# Patient Record
Sex: FEMALE | Race: WHITE | Employment: FULL TIME | URBAN - METROPOLITAN AREA
[De-identification: names, ages, dates, MRNs, and addresses within clinical notes are randomized per-mention and may not be internally consistent; named-entity substitution may affect disease eponyms.]

---

## 2019-12-11 ENCOUNTER — HOSPITAL ENCOUNTER (EMERGENCY)
Age: 64
Discharge: ANOTHER HOSPITAL | End: 2019-12-11
Attending: EMERGENCY MEDICINE
Payer: MEDICARE

## 2019-12-11 ENCOUNTER — APPOINTMENT (OUTPATIENT)
Dept: GENERAL RADIOLOGY | Age: 64
End: 2019-12-11
Attending: EMERGENCY MEDICINE
Payer: MEDICARE

## 2019-12-11 VITALS
RESPIRATION RATE: 17 BRPM | DIASTOLIC BLOOD PRESSURE: 73 MMHG | TEMPERATURE: 97.7 F | OXYGEN SATURATION: 100 % | SYSTOLIC BLOOD PRESSURE: 109 MMHG | HEART RATE: 92 BPM

## 2019-12-11 DIAGNOSIS — S21.212A STAB WOUND OF LEFT SIDE OF BACK, INITIAL ENCOUNTER: Primary | ICD-10-CM

## 2019-12-11 DIAGNOSIS — S11.91XA STAB WOUND OF NECK, INITIAL ENCOUNTER: ICD-10-CM

## 2019-12-11 DIAGNOSIS — S21.119A STAB WOUND OF CHEST, UNSPECIFIED LATERALITY, INITIAL ENCOUNTER: ICD-10-CM

## 2019-12-11 LAB
ALBUMIN SERPL-MCNC: 4.1 G/DL (ref 3.4–5)
ALBUMIN/GLOB SERPL: 1.6 {RATIO} (ref 0.8–1.7)
ALP SERPL-CCNC: 89 U/L (ref 45–117)
ALT SERPL-CCNC: 51 U/L (ref 13–56)
ANION GAP BLD CALC-SCNC: 18 MMOL/L (ref 10–20)
ANION GAP SERPL CALC-SCNC: 8 MMOL/L (ref 3–18)
AST SERPL-CCNC: 61 U/L (ref 10–38)
BASOPHILS # BLD: 0.1 K/UL (ref 0–0.1)
BASOPHILS NFR BLD: 1 % (ref 0–2)
BILIRUB SERPL-MCNC: 0.4 MG/DL (ref 0.2–1)
BUN BLD-MCNC: 17 MG/DL (ref 7–18)
BUN SERPL-MCNC: 16 MG/DL (ref 7–18)
BUN/CREAT SERPL: 16 (ref 12–20)
CA-I BLD-MCNC: 1.14 MMOL/L (ref 1.12–1.32)
CALCIUM SERPL-MCNC: 8.5 MG/DL (ref 8.5–10.1)
CHLORIDE BLD-SCNC: 105 MMOL/L (ref 100–108)
CHLORIDE SERPL-SCNC: 109 MMOL/L (ref 100–111)
CO2 BLD-SCNC: 24 MMOL/L (ref 19–24)
CO2 SERPL-SCNC: 26 MMOL/L (ref 21–32)
CREAT SERPL-MCNC: 0.98 MG/DL (ref 0.6–1.3)
CREAT UR-MCNC: 1.1 MG/DL (ref 0.6–1.3)
DIFFERENTIAL METHOD BLD: ABNORMAL
EOSINOPHIL # BLD: 0.2 K/UL (ref 0–0.4)
EOSINOPHIL NFR BLD: 2 % (ref 0–5)
ERYTHROCYTE [DISTWIDTH] IN BLOOD BY AUTOMATED COUNT: 13.1 % (ref 11.6–14.5)
ETHANOL SERPL-MCNC: 240 MG/DL (ref 0–3)
GLOBULIN SER CALC-MCNC: 2.5 G/DL (ref 2–4)
GLUCOSE BLD STRIP.AUTO-MCNC: 161 MG/DL (ref 74–106)
GLUCOSE SERPL-MCNC: 158 MG/DL (ref 74–99)
HCT VFR BLD AUTO: 39.7 % (ref 35–45)
HCT VFR BLD CALC: 39 % (ref 36–49)
HGB BLD-MCNC: 13 G/DL (ref 12–16)
HGB BLD-MCNC: 13.3 G/DL (ref 12–16)
INR PPP: 1 (ref 0.8–1.2)
LYMPHOCYTES # BLD: 3.3 K/UL (ref 0.9–3.6)
LYMPHOCYTES NFR BLD: 35 % (ref 21–52)
MCH RBC QN AUTO: 32.2 PG (ref 24–34)
MCHC RBC AUTO-ENTMCNC: 32.7 G/DL (ref 31–37)
MCV RBC AUTO: 98.3 FL (ref 74–97)
MONOCYTES # BLD: 0.5 K/UL (ref 0.05–1.2)
MONOCYTES NFR BLD: 5 % (ref 3–10)
NEUTS SEG # BLD: 5.3 K/UL (ref 1.8–8)
NEUTS SEG NFR BLD: 57 % (ref 40–73)
PLATELET # BLD AUTO: 345 K/UL (ref 135–420)
PMV BLD AUTO: 10 FL (ref 9.2–11.8)
POTASSIUM BLD-SCNC: 3.6 MMOL/L (ref 3.5–5.5)
POTASSIUM SERPL-SCNC: 3.6 MMOL/L (ref 3.5–5.5)
PROT SERPL-MCNC: 6.6 G/DL (ref 6.4–8.2)
PROTHROMBIN TIME: 13 SEC (ref 11.5–15.2)
RBC # BLD AUTO: 4.04 M/UL (ref 4.2–5.3)
SODIUM BLD-SCNC: 142 MMOL/L (ref 136–145)
SODIUM SERPL-SCNC: 143 MMOL/L (ref 136–145)
WBC # BLD AUTO: 9.4 K/UL (ref 4.6–13.2)

## 2019-12-11 PROCEDURE — 85025 COMPLETE CBC W/AUTO DIFF WBC: CPT

## 2019-12-11 PROCEDURE — 80053 COMPREHEN METABOLIC PANEL: CPT

## 2019-12-11 PROCEDURE — 36430 TRANSFUSION BLD/BLD COMPNT: CPT

## 2019-12-11 PROCEDURE — 99285 EMERGENCY DEPT VISIT HI MDM: CPT

## 2019-12-11 PROCEDURE — 85610 PROTHROMBIN TIME: CPT

## 2019-12-11 PROCEDURE — 71045 X-RAY EXAM CHEST 1 VIEW: CPT

## 2019-12-11 PROCEDURE — 80047 BASIC METABLC PNL IONIZED CA: CPT

## 2019-12-11 PROCEDURE — 86900 BLOOD TYPING SEROLOGIC ABO: CPT

## 2019-12-11 PROCEDURE — 75810000293 HC SIMP/SUPERF WND  RPR

## 2019-12-11 PROCEDURE — 80307 DRUG TEST PRSMV CHEM ANLYZR: CPT

## 2019-12-11 PROCEDURE — P9016 RBC LEUKOCYTES REDUCED: HCPCS

## 2019-12-11 PROCEDURE — 86920 COMPATIBILITY TEST SPIN: CPT

## 2019-12-11 RX ORDER — SODIUM CHLORIDE 9 MG/ML
250 INJECTION, SOLUTION INTRAVENOUS AS NEEDED
Status: DISCONTINUED | OUTPATIENT
Start: 2019-12-11 | End: 2019-12-12 | Stop reason: HOSPADM

## 2019-12-12 LAB
ABO + RH BLD: NORMAL
BLD PROD TYP BPU: NORMAL
BLOOD GROUP ANTIBODIES SERPL: NORMAL
BPU ID: NORMAL
CALLED TO:,BCALL1: NORMAL
CROSSMATCH RESULT,%XM: NORMAL
SPECIMEN EXP DATE BLD: NORMAL
STATUS OF UNIT,%ST: NORMAL
UNIT DIVISION, %UDIV: 0
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
## 2022-08-31 ENCOUNTER — ANESTHESIA EVENT (OUTPATIENT)
Dept: SURGERY | Age: 67
DRG: 504 | End: 2022-08-31
Payer: MEDICARE

## 2022-08-31 ENCOUNTER — ANESTHESIA (OUTPATIENT)
Dept: SURGERY | Age: 67
DRG: 504 | End: 2022-08-31
Payer: MEDICARE

## 2022-08-31 ENCOUNTER — APPOINTMENT (OUTPATIENT)
Dept: GENERAL RADIOLOGY | Age: 67
DRG: 504 | End: 2022-08-31
Attending: EMERGENCY MEDICINE
Payer: MEDICARE

## 2022-08-31 ENCOUNTER — APPOINTMENT (OUTPATIENT)
Dept: GENERAL RADIOLOGY | Age: 67
DRG: 504 | End: 2022-08-31
Attending: ORTHOPAEDIC SURGERY
Payer: MEDICARE

## 2022-08-31 ENCOUNTER — HOSPITAL ENCOUNTER (INPATIENT)
Age: 67
LOS: 9 days | Discharge: NURSING, REHAB OR LONG-TERM CARE | DRG: 504 | End: 2022-09-09
Attending: EMERGENCY MEDICINE | Admitting: ORTHOPAEDIC SURGERY
Payer: MEDICARE

## 2022-08-31 DIAGNOSIS — S92.421B OPEN DISPLACED FRACTURE OF DISTAL PHALANX OF RIGHT GREAT TOE, INITIAL ENCOUNTER: ICD-10-CM

## 2022-08-31 DIAGNOSIS — S92.424B OPEN NONDISPLACED FRACTURE OF DISTAL PHALANX OF RIGHT GREAT TOE, INITIAL ENCOUNTER: Primary | ICD-10-CM

## 2022-08-31 LAB
ABO + RH BLD: NORMAL
ALBUMIN SERPL-MCNC: 4.1 G/DL (ref 3.4–5)
ALBUMIN/GLOB SERPL: 1.1 {RATIO} (ref 0.8–1.7)
ALP SERPL-CCNC: 105 U/L (ref 45–117)
ALT SERPL-CCNC: 18 U/L (ref 13–56)
ANION GAP SERPL CALC-SCNC: 2 MMOL/L (ref 3–18)
AST SERPL-CCNC: 14 U/L (ref 10–38)
ATRIAL RATE: 83 BPM
BASOPHILS # BLD: 0.1 K/UL (ref 0–0.1)
BASOPHILS NFR BLD: 1 % (ref 0–2)
BILIRUB SERPL-MCNC: 0.4 MG/DL (ref 0.2–1)
BLOOD GROUP ANTIBODIES SERPL: NORMAL
BUN SERPL-MCNC: 11 MG/DL (ref 7–18)
BUN/CREAT SERPL: 12 (ref 12–20)
CALCIUM SERPL-MCNC: 9.3 MG/DL (ref 8.5–10.1)
CALCULATED P AXIS, ECG09: 30 DEGREES
CALCULATED R AXIS, ECG10: 36 DEGREES
CALCULATED T AXIS, ECG11: 4 DEGREES
CHLORIDE SERPL-SCNC: 110 MMOL/L (ref 100–111)
CO2 SERPL-SCNC: 29 MMOL/L (ref 21–32)
CREAT SERPL-MCNC: 0.9 MG/DL (ref 0.6–1.3)
DIAGNOSIS, 93000: NORMAL
DIFFERENTIAL METHOD BLD: ABNORMAL
EOSINOPHIL # BLD: 0.1 K/UL (ref 0–0.4)
EOSINOPHIL NFR BLD: 2 % (ref 0–5)
ERYTHROCYTE [DISTWIDTH] IN BLOOD BY AUTOMATED COUNT: 12.6 % (ref 11.6–14.5)
GLOBULIN SER CALC-MCNC: 3.9 G/DL (ref 2–4)
GLUCOSE SERPL-MCNC: 104 MG/DL (ref 74–99)
HCT VFR BLD AUTO: 44.9 % (ref 35–45)
HGB BLD-MCNC: 14.7 G/DL (ref 12–16)
IMM GRANULOCYTES # BLD AUTO: 0 K/UL (ref 0–0.04)
IMM GRANULOCYTES NFR BLD AUTO: 1 % (ref 0–0.5)
INR PPP: 0.9 (ref 0.8–1.2)
LYMPHOCYTES # BLD: 1.9 K/UL (ref 0.9–3.6)
LYMPHOCYTES NFR BLD: 26 % (ref 21–52)
MCH RBC QN AUTO: 30.6 PG (ref 24–34)
MCHC RBC AUTO-ENTMCNC: 32.7 G/DL (ref 31–37)
MCV RBC AUTO: 93.5 FL (ref 78–100)
MONOCYTES # BLD: 0.5 K/UL (ref 0.05–1.2)
MONOCYTES NFR BLD: 7 % (ref 3–10)
NEUTS SEG # BLD: 4.9 K/UL (ref 1.8–8)
NEUTS SEG NFR BLD: 65 % (ref 40–73)
NRBC # BLD: 0 K/UL (ref 0–0.01)
NRBC BLD-RTO: 0 PER 100 WBC
P-R INTERVAL, ECG05: 146 MS
PLATELET # BLD AUTO: 280 K/UL (ref 135–420)
PMV BLD AUTO: 9.3 FL (ref 9.2–11.8)
POTASSIUM SERPL-SCNC: 4.9 MMOL/L (ref 3.5–5.5)
PROT SERPL-MCNC: 8 G/DL (ref 6.4–8.2)
PROTHROMBIN TIME: 12.7 SEC (ref 11.5–15.2)
Q-T INTERVAL, ECG07: 406 MS
QRS DURATION, ECG06: 98 MS
QTC CALCULATION (BEZET), ECG08: 477 MS
RBC # BLD AUTO: 4.8 M/UL (ref 4.2–5.3)
SODIUM SERPL-SCNC: 141 MMOL/L (ref 136–145)
SPECIMEN EXP DATE BLD: NORMAL
VENTRICULAR RATE, ECG03: 83 BPM
WBC # BLD AUTO: 7.5 K/UL (ref 4.6–13.2)

## 2022-08-31 PROCEDURE — 77030040361 HC SLV COMPR DVT MDII -B: Performed by: ORTHOPAEDIC SURGERY

## 2022-08-31 PROCEDURE — 99221 1ST HOSP IP/OBS SF/LOW 40: CPT | Performed by: ORTHOPAEDIC SURGERY

## 2022-08-31 PROCEDURE — 77030040503 HC DRN WND PENRS MDII -A: Performed by: ORTHOPAEDIC SURGERY

## 2022-08-31 PROCEDURE — 74011250636 HC RX REV CODE- 250/636: Performed by: ORTHOPAEDIC SURGERY

## 2022-08-31 PROCEDURE — 0QSQ04Z REPOSITION RIGHT TOE PHALANX WITH INTERNAL FIXATION DEVICE, OPEN APPROACH: ICD-10-PCS | Performed by: ORTHOPAEDIC SURGERY

## 2022-08-31 PROCEDURE — 99140 ANES COMP EMERGENCY COND: CPT | Performed by: NURSE ANESTHETIST, CERTIFIED REGISTERED

## 2022-08-31 PROCEDURE — 74011250636 HC RX REV CODE- 250/636: Performed by: EMERGENCY MEDICINE

## 2022-08-31 PROCEDURE — 74011000250 HC RX REV CODE- 250: Performed by: ORTHOPAEDIC SURGERY

## 2022-08-31 PROCEDURE — 80053 COMPREHEN METABOLIC PANEL: CPT

## 2022-08-31 PROCEDURE — 73620 X-RAY EXAM OF FOOT: CPT

## 2022-08-31 PROCEDURE — 11012 DEB SKIN BONE AT FX SITE: CPT | Performed by: ORTHOPAEDIC SURGERY

## 2022-08-31 PROCEDURE — 0HQRXZZ REPAIR TOE NAIL, EXTERNAL APPROACH: ICD-10-PCS | Performed by: ORTHOPAEDIC SURGERY

## 2022-08-31 PROCEDURE — 71045 X-RAY EXAM CHEST 1 VIEW: CPT

## 2022-08-31 PROCEDURE — 74011000250 HC RX REV CODE- 250: Performed by: NURSE ANESTHETIST, CERTIFIED REGISTERED

## 2022-08-31 PROCEDURE — 74011250637 HC RX REV CODE- 250/637: Performed by: ORTHOPAEDIC SURGERY

## 2022-08-31 PROCEDURE — 74011250636 HC RX REV CODE- 250/636: Performed by: NURSE ANESTHETIST, CERTIFIED REGISTERED

## 2022-08-31 PROCEDURE — 77030018836 HC SOL IRR NACL ICUM -A: Performed by: ORTHOPAEDIC SURGERY

## 2022-08-31 PROCEDURE — 77030010509 HC AIRWY LMA MSK TELE -A: Performed by: ANESTHESIOLOGY

## 2022-08-31 PROCEDURE — 76210000016 HC OR PH I REC 1 TO 1.5 HR: Performed by: ORTHOPAEDIC SURGERY

## 2022-08-31 PROCEDURE — 65270000029 HC RM PRIVATE

## 2022-08-31 PROCEDURE — 74011250636 HC RX REV CODE- 250/636: Performed by: ANESTHESIOLOGY

## 2022-08-31 PROCEDURE — 76010000153 HC OR TIME 1.5 TO 2 HR: Performed by: ORTHOPAEDIC SURGERY

## 2022-08-31 PROCEDURE — 01480 ANES OPEN PX LOWER L/A/F NOS: CPT | Performed by: NURSE ANESTHETIST, CERTIFIED REGISTERED

## 2022-08-31 PROCEDURE — 99140 ANES COMP EMERGENCY COND: CPT | Performed by: ANESTHESIOLOGY

## 2022-08-31 PROCEDURE — 90471 IMMUNIZATION ADMIN: CPT

## 2022-08-31 PROCEDURE — 28505 TREAT BIG TOE FRACTURE: CPT | Performed by: ORTHOPAEDIC SURGERY

## 2022-08-31 PROCEDURE — 85610 PROTHROMBIN TIME: CPT

## 2022-08-31 PROCEDURE — 11760 REPAIR OF NAIL BED: CPT | Performed by: ORTHOPAEDIC SURGERY

## 2022-08-31 PROCEDURE — 90715 TDAP VACCINE 7 YRS/> IM: CPT | Performed by: EMERGENCY MEDICINE

## 2022-08-31 PROCEDURE — 2709999900 HC NON-CHARGEABLE SUPPLY: Performed by: ORTHOPAEDIC SURGERY

## 2022-08-31 PROCEDURE — 86900 BLOOD TYPING SEROLOGIC ABO: CPT

## 2022-08-31 PROCEDURE — 76060000034 HC ANESTHESIA 1.5 TO 2 HR: Performed by: ORTHOPAEDIC SURGERY

## 2022-08-31 PROCEDURE — 77030040922 HC BLNKT HYPOTHRM STRY -A: Performed by: ORTHOPAEDIC SURGERY

## 2022-08-31 PROCEDURE — 74011000250 HC RX REV CODE- 250: Performed by: ANESTHESIOLOGY

## 2022-08-31 PROCEDURE — 85025 COMPLETE CBC W/AUTO DIFF WBC: CPT

## 2022-08-31 PROCEDURE — 99285 EMERGENCY DEPT VISIT HI MDM: CPT

## 2022-08-31 PROCEDURE — 01480 ANES OPEN PX LOWER L/A/F NOS: CPT | Performed by: ANESTHESIOLOGY

## 2022-08-31 PROCEDURE — 2709999900 HC NON-CHARGEABLE SUPPLY

## 2022-08-31 PROCEDURE — 73660 X-RAY EXAM OF TOE(S): CPT

## 2022-08-31 PROCEDURE — 93005 ELECTROCARDIOGRAM TRACING: CPT

## 2022-08-31 DEVICE — C-WIRE PAK DOUBLE ENDED ORTHOPAEDIC WIRE, SPADE, .062" (1.57 MM)
Type: IMPLANTABLE DEVICE | Site: TOE | Status: FUNCTIONAL
Brand: C-WIRE

## 2022-08-31 RX ORDER — NALBUPHINE HYDROCHLORIDE 20 MG/ML
5 INJECTION, SOLUTION INTRAMUSCULAR; INTRAVENOUS; SUBCUTANEOUS
Status: DISCONTINUED | OUTPATIENT
Start: 2022-08-31 | End: 2022-08-31 | Stop reason: HOSPADM

## 2022-08-31 RX ORDER — FENTANYL CITRATE 50 UG/ML
INJECTION, SOLUTION INTRAMUSCULAR; INTRAVENOUS AS NEEDED
Status: DISCONTINUED | OUTPATIENT
Start: 2022-08-31 | End: 2022-08-31 | Stop reason: HOSPADM

## 2022-08-31 RX ORDER — SODIUM CHLORIDE 0.9 % (FLUSH) 0.9 %
5-40 SYRINGE (ML) INJECTION AS NEEDED
Status: DISCONTINUED | OUTPATIENT
Start: 2022-08-31 | End: 2022-08-31 | Stop reason: HOSPADM

## 2022-08-31 RX ORDER — MIDAZOLAM HYDROCHLORIDE 1 MG/ML
INJECTION, SOLUTION INTRAMUSCULAR; INTRAVENOUS AS NEEDED
Status: DISCONTINUED | OUTPATIENT
Start: 2022-08-31 | End: 2022-08-31 | Stop reason: HOSPADM

## 2022-08-31 RX ORDER — DIPHENHYDRAMINE HYDROCHLORIDE 50 MG/ML
12.5 INJECTION, SOLUTION INTRAMUSCULAR; INTRAVENOUS
Status: DISCONTINUED | OUTPATIENT
Start: 2022-08-31 | End: 2022-08-31 | Stop reason: HOSPADM

## 2022-08-31 RX ORDER — MORPHINE SULFATE 4 MG/ML
4 INJECTION INTRAVENOUS
Status: DISCONTINUED | OUTPATIENT
Start: 2022-08-31 | End: 2022-09-01

## 2022-08-31 RX ORDER — SODIUM CHLORIDE, SODIUM LACTATE, POTASSIUM CHLORIDE, CALCIUM CHLORIDE 600; 310; 30; 20 MG/100ML; MG/100ML; MG/100ML; MG/100ML
125 INJECTION, SOLUTION INTRAVENOUS CONTINUOUS
Status: DISCONTINUED | OUTPATIENT
Start: 2022-08-31 | End: 2022-08-31 | Stop reason: HOSPADM

## 2022-08-31 RX ORDER — KETOROLAC TROMETHAMINE 15 MG/ML
INJECTION, SOLUTION INTRAMUSCULAR; INTRAVENOUS AS NEEDED
Status: DISCONTINUED | OUTPATIENT
Start: 2022-08-31 | End: 2022-08-31 | Stop reason: HOSPADM

## 2022-08-31 RX ORDER — PROPOFOL 10 MG/ML
INJECTION, EMULSION INTRAVENOUS AS NEEDED
Status: DISCONTINUED | OUTPATIENT
Start: 2022-08-31 | End: 2022-08-31 | Stop reason: HOSPADM

## 2022-08-31 RX ORDER — HYDROMORPHONE HYDROCHLORIDE 2 MG/ML
0.5 INJECTION, SOLUTION INTRAMUSCULAR; INTRAVENOUS; SUBCUTANEOUS
Status: COMPLETED | OUTPATIENT
Start: 2022-08-31 | End: 2022-08-31

## 2022-08-31 RX ORDER — DEXAMETHASONE SODIUM PHOSPHATE 4 MG/ML
INJECTION, SOLUTION INTRA-ARTICULAR; INTRALESIONAL; INTRAMUSCULAR; INTRAVENOUS; SOFT TISSUE AS NEEDED
Status: DISCONTINUED | OUTPATIENT
Start: 2022-08-31 | End: 2022-08-31 | Stop reason: HOSPADM

## 2022-08-31 RX ORDER — ONDANSETRON 2 MG/ML
4 INJECTION INTRAMUSCULAR; INTRAVENOUS ONCE
Status: COMPLETED | OUTPATIENT
Start: 2022-08-31 | End: 2022-08-31

## 2022-08-31 RX ORDER — OXYCODONE AND ACETAMINOPHEN 7.5; 325 MG/1; MG/1
1 TABLET ORAL
Status: DISCONTINUED | OUTPATIENT
Start: 2022-08-31 | End: 2022-09-09 | Stop reason: HOSPADM

## 2022-08-31 RX ORDER — ALPRAZOLAM 0.5 MG/1
1 TABLET ORAL 2 TIMES DAILY
Status: DISCONTINUED | OUTPATIENT
Start: 2022-08-31 | End: 2022-09-09 | Stop reason: HOSPADM

## 2022-08-31 RX ORDER — ONDANSETRON 2 MG/ML
INJECTION INTRAMUSCULAR; INTRAVENOUS AS NEEDED
Status: DISCONTINUED | OUTPATIENT
Start: 2022-08-31 | End: 2022-08-31 | Stop reason: HOSPADM

## 2022-08-31 RX ORDER — SODIUM CHLORIDE 0.9 % (FLUSH) 0.9 %
5-40 SYRINGE (ML) INJECTION EVERY 8 HOURS
Status: DISCONTINUED | OUTPATIENT
Start: 2022-08-31 | End: 2022-09-09 | Stop reason: HOSPADM

## 2022-08-31 RX ORDER — QUETIAPINE FUMARATE 25 MG/1
25 TABLET, FILM COATED ORAL 3 TIMES DAILY
Status: DISCONTINUED | OUTPATIENT
Start: 2022-08-31 | End: 2022-09-09 | Stop reason: HOSPADM

## 2022-08-31 RX ORDER — SODIUM CHLORIDE, SODIUM LACTATE, POTASSIUM CHLORIDE, CALCIUM CHLORIDE 600; 310; 30; 20 MG/100ML; MG/100ML; MG/100ML; MG/100ML
75 INJECTION, SOLUTION INTRAVENOUS CONTINUOUS
Status: DISCONTINUED | OUTPATIENT
Start: 2022-08-31 | End: 2022-08-31 | Stop reason: HOSPADM

## 2022-08-31 RX ORDER — NALOXONE HYDROCHLORIDE 0.4 MG/ML
0.4 INJECTION, SOLUTION INTRAMUSCULAR; INTRAVENOUS; SUBCUTANEOUS AS NEEDED
Status: DISCONTINUED | OUTPATIENT
Start: 2022-08-31 | End: 2022-09-09 | Stop reason: HOSPADM

## 2022-08-31 RX ORDER — LAMOTRIGINE 25 MG/1
25 TABLET ORAL DAILY
Status: DISCONTINUED | OUTPATIENT
Start: 2022-09-01 | End: 2022-09-09 | Stop reason: HOSPADM

## 2022-08-31 RX ORDER — LIDOCAINE HYDROCHLORIDE 20 MG/ML
INJECTION, SOLUTION EPIDURAL; INFILTRATION; INTRACAUDAL; PERINEURAL AS NEEDED
Status: DISCONTINUED | OUTPATIENT
Start: 2022-08-31 | End: 2022-08-31 | Stop reason: HOSPADM

## 2022-08-31 RX ORDER — SODIUM CHLORIDE 0.9 % (FLUSH) 0.9 %
5-40 SYRINGE (ML) INJECTION AS NEEDED
Status: DISCONTINUED | OUTPATIENT
Start: 2022-08-31 | End: 2022-09-09 | Stop reason: HOSPADM

## 2022-08-31 RX ORDER — SODIUM CHLORIDE 0.9 % (FLUSH) 0.9 %
5-40 SYRINGE (ML) INJECTION EVERY 8 HOURS
Status: DISCONTINUED | OUTPATIENT
Start: 2022-08-31 | End: 2022-08-31 | Stop reason: HOSPADM

## 2022-08-31 RX ORDER — ONDANSETRON 2 MG/ML
4 INJECTION INTRAMUSCULAR; INTRAVENOUS
Status: DISCONTINUED | OUTPATIENT
Start: 2022-08-31 | End: 2022-09-09 | Stop reason: HOSPADM

## 2022-08-31 RX ORDER — OXYCODONE HYDROCHLORIDE 10 MG/1
10 TABLET ORAL
Status: DISCONTINUED | OUTPATIENT
Start: 2022-08-31 | End: 2022-09-09 | Stop reason: HOSPADM

## 2022-08-31 RX ORDER — HYDROMORPHONE HYDROCHLORIDE 1 MG/ML
1 INJECTION, SOLUTION INTRAMUSCULAR; INTRAVENOUS; SUBCUTANEOUS
Status: DISCONTINUED | OUTPATIENT
Start: 2022-08-31 | End: 2022-09-09 | Stop reason: HOSPADM

## 2022-08-31 RX ORDER — DIVALPROEX SODIUM 250 MG/1
250 TABLET, EXTENDED RELEASE ORAL DAILY
Status: DISCONTINUED | OUTPATIENT
Start: 2022-09-01 | End: 2022-09-07

## 2022-08-31 RX ADMIN — SODIUM CHLORIDE, POTASSIUM CHLORIDE, SODIUM LACTATE AND CALCIUM CHLORIDE 125 ML/HR: 600; 310; 30; 20 INJECTION, SOLUTION INTRAVENOUS at 16:35

## 2022-08-31 RX ADMIN — KETOROLAC TROMETHAMINE 15 MG: 15 INJECTION, SOLUTION INTRAMUSCULAR; INTRAVENOUS at 17:53

## 2022-08-31 RX ADMIN — WATER 2 G: 1 INJECTION INTRAMUSCULAR; INTRAVENOUS; SUBCUTANEOUS at 21:21

## 2022-08-31 RX ADMIN — PROPOFOL 150 MG: 10 INJECTION, EMULSION INTRAVENOUS at 17:34

## 2022-08-31 RX ADMIN — OXYCODONE HYDROCHLORIDE 10 MG: 10 TABLET ORAL at 20:05

## 2022-08-31 RX ADMIN — TETANUS TOXOID, REDUCED DIPHTHERIA TOXOID AND ACELLULAR PERTUSSIS VACCINE, ADSORBED 0.5 ML: 5; 2.5; 8; 8; 2.5 SUSPENSION INTRAMUSCULAR at 10:57

## 2022-08-31 RX ADMIN — MORPHINE SULFATE 4 MG: 4 INJECTION, SOLUTION INTRAMUSCULAR; INTRAVENOUS at 12:25

## 2022-08-31 RX ADMIN — ONDANSETRON 4 MG: 2 INJECTION INTRAMUSCULAR; INTRAVENOUS at 19:05

## 2022-08-31 RX ADMIN — LIDOCAINE HYDROCHLORIDE 20 MG: 20 INJECTION, SOLUTION EPIDURAL; INFILTRATION; INTRACAUDAL; PERINEURAL at 17:34

## 2022-08-31 RX ADMIN — FAMOTIDINE 20 MG: 10 INJECTION, SOLUTION INTRAVENOUS at 16:35

## 2022-08-31 RX ADMIN — FENTANYL CITRATE 50 MCG: 50 INJECTION, SOLUTION INTRAMUSCULAR; INTRAVENOUS at 17:51

## 2022-08-31 RX ADMIN — DEXAMETHASONE SODIUM PHOSPHATE 4 MG: 4 INJECTION, SOLUTION INTRAMUSCULAR; INTRAVENOUS at 17:34

## 2022-08-31 RX ADMIN — HYDROMORPHONE HYDROCHLORIDE 0.5 MG: 2 INJECTION, SOLUTION INTRAMUSCULAR; INTRAVENOUS; SUBCUTANEOUS at 19:30

## 2022-08-31 RX ADMIN — FENTANYL CITRATE 50 MCG: 50 INJECTION, SOLUTION INTRAMUSCULAR; INTRAVENOUS at 17:53

## 2022-08-31 RX ADMIN — HYDROMORPHONE HYDROCHLORIDE 0.5 MG: 2 INJECTION, SOLUTION INTRAMUSCULAR; INTRAVENOUS; SUBCUTANEOUS at 19:12

## 2022-08-31 RX ADMIN — ALPRAZOLAM 1 MG: 0.5 TABLET ORAL at 21:21

## 2022-08-31 RX ADMIN — MIDAZOLAM HYDROCHLORIDE 2 MG: 2 INJECTION, SOLUTION INTRAMUSCULAR; INTRAVENOUS at 17:28

## 2022-08-31 RX ADMIN — SODIUM CHLORIDE, PRESERVATIVE FREE 10 ML: 5 INJECTION INTRAVENOUS at 21:22

## 2022-08-31 RX ADMIN — ONDANSETRON 4 MG: 2 INJECTION INTRAMUSCULAR; INTRAVENOUS at 12:24

## 2022-08-31 RX ADMIN — QUETIAPINE FUMARATE 25 MG: 25 TABLET ORAL at 21:21

## 2022-08-31 RX ADMIN — HYDROMORPHONE HYDROCHLORIDE 0.5 MG: 2 INJECTION, SOLUTION INTRAMUSCULAR; INTRAVENOUS; SUBCUTANEOUS at 19:04

## 2022-08-31 RX ADMIN — HYDROMORPHONE HYDROCHLORIDE 0.5 MG: 2 INJECTION, SOLUTION INTRAMUSCULAR; INTRAVENOUS; SUBCUTANEOUS at 19:21

## 2022-08-31 RX ADMIN — ONDANSETRON 4 MG: 2 INJECTION INTRAMUSCULAR; INTRAVENOUS at 17:34

## 2022-08-31 RX ADMIN — WATER 2 G: 1 INJECTION INTRAMUSCULAR; INTRAVENOUS; SUBCUTANEOUS at 13:15

## 2022-08-31 NOTE — ED NOTES
Assumed care of pt in rm 6. Pt arrived via EMS for partially amputated right great toe. Pt reports she was runny and tripped hitting her toe. Noted with very lg laceration across top of toe, toe appears to still be intact. Sm amt of bleeding noted. 20 ga PIV noted to L AC, Pt medicated by EMS with 50 mcg Fentanyl and Zofran. Pt very tearful. Placed on monitor, will update tetanus. Portable xray done.

## 2022-08-31 NOTE — ROUTINE PROCESS
TRANSFER - IN REPORT:    Verbal report received from Salomón Villegas RN on Vince Hammond  being received from ER for ordered procedure      Report consisted of patients Situation, Background, Assessment and   Recommendations(SBAR). Information from the following report(s) SBAR was reviewed with the receiving nurse. Opportunity for questions and clarification was provided. Assessment completed upon patients arrival to unit and care assumed.

## 2022-08-31 NOTE — ED PROVIDER NOTES
EMERGENCY DEPARTMENT HISTORY AND PHYSICAL EXAM      Date: 8/31/2022  Patient Name: Samantha Singh      History of Presenting Illness     Chief Complaint   Patient presents with    Toe Injury       History Provided By: Patient  Location/Duration/Severity/Modifying factors   56-year-old female who tripped and missed a concrete step as she tried to turn, stubbed her right great toe and noted a immediate pain and a laceration. EMS called for same. Patient notes that she is otherwise very healthy, does not take any blood thinners, is not diabetic etc.      Toe Injury   Pertinent negatives include no numbness. There are no other complaints, changes, or physical findings at this time.     PCP: Klaus Brown MD    Current Facility-Administered Medications   Medication Dose Route Frequency Provider Last Rate Last Admin    morphine injection 4 mg  4 mg IntraVENous Q4H PRN James Chavez MD   4 mg at 08/31/22 1225    ondansetron (ZOFRAN) injection 4 mg  4 mg IntraVENous Q6H PRN James Chavez MD   4 mg at 08/31/22 1224    lactated Ringers infusion  125 mL/hr IntraVENous CONTINUOUS Abeba Hodge  mL/hr at 08/31/22 1635 125 mL/hr at 08/31/22 1635     Facility-Administered Medications Ordered in Other Encounters   Medication Dose Route Frequency Provider Last Rate Last Admin    midazolam (VERSED) injection   IntraVENous PRN Foreign Isha, CRNA   2 mg at 08/31/22 1728    lidocaine (PF) (XYLOCAINE) 20 mg/mL (2 %) injection   IntraVENous PRN Foreign Vieira, CRNA   20 mg at 08/31/22 1734    propofoL (DIPRIVAN) 10 mg/mL injection   IntraVENous PRN Foreign Isha, CRNA   150 mg at 08/31/22 1734    ondansetron (ZOFRAN) injection   IntraVENous PRN Foreign Isha, CRNA   4 mg at 08/31/22 1734    dexamethasone (DECADRON) 4 mg/mL injection   IntraVENous PRN Foreign Isha, CRNA   4 mg at 08/31/22 1734    fentaNYL citrate (PF) injection   IntraVENous PRN Foreign Isha, CRNA   50 mcg at 08/31/22 1753 ketorolac (TORADOL) injection   IntraVENous PRN Rommelsigifredo Sara, CRNA   15 mg at 08/31/22 1708       Past History     Past Medical History:  Past Medical History:   Diagnosis Date    Bipolar 2 disorder ([address]     Osteoarthritis        Past Surgical History:  Past Surgical History:   Procedure Laterality Date    HX BREAST AUGMENTATION      HX HYSTERECTOMY         Family History:  History reviewed. No pertinent family history. Social History:  Social History     Tobacco Use    Smoking status: Never    Smokeless tobacco: Never   Substance Use Topics    Alcohol use: Yes     Comment: rarely    Drug use: Never       Allergies:  No Known Allergies      Review of Systems     Review of Systems   Constitutional:  Negative for fatigue and fever. HENT:  Negative for sore throat and trouble swallowing. Eyes:  Negative for photophobia and visual disturbance. Respiratory:  Negative for cough and shortness of breath. Cardiovascular:  Negative for chest pain and palpitations. Gastrointestinal:  Negative for abdominal pain and diarrhea. Genitourinary:  Negative for dysuria and flank pain. Musculoskeletal:  Negative for gait problem and joint swelling. Skin:  Negative for pallor and rash. Neurological:  Negative for weakness and numbness. Physical Exam     Physical Exam  Vitals and nursing note reviewed. Constitutional:       Appearance: Normal appearance. HENT:      Head: Normocephalic and atraumatic. Eyes:      Extraocular Movements: Extraocular movements intact. Pupils: Pupils are equal, round, and reactive to light. Cardiovascular:      Rate and Rhythm: Normal rate and regular rhythm. Pulmonary:      Effort: Pulmonary effort is normal.      Breath sounds: Normal breath sounds. Abdominal:      General: There is no distension. Palpations: Abdomen is soft. Tenderness: There is no abdominal tenderness. Musculoskeletal:         General: No swelling. Normal range of motion. Cervical back: Neck supple. Comments: Dressing taken down the right great toe noted stellate laceration from medial aspect about the IP joints proceeding distally in a curvilinear fashion distal to the nailbed and curving around to the proximal IP joint on the lateral side of the dorsal aspect of the great toe. Mild oozing bleeding, no arterial bleeding. Neurological:      General: No focal deficit present. Mental Status: She is alert. Lab and Diagnostic Study Results     Labs -  Recent Results (from the past 24 hour(s))   CBC WITH AUTOMATED DIFF    Collection Time: 08/31/22 12:07 PM   Result Value Ref Range    WBC 7.5 4.6 - 13.2 K/uL    RBC 4.80 4.20 - 5.30 M/uL    HGB 14.7 12.0 - 16.0 g/dL    HCT 44.9 35.0 - 45.0 %    MCV 93.5 78.0 - 100.0 FL    MCH 30.6 24.0 - 34.0 PG    MCHC 32.7 31.0 - 37.0 g/dL    RDW 12.6 11.6 - 14.5 %    PLATELET 301 735 - 392 K/uL    MPV 9.3 9.2 - 11.8 FL    NRBC 0.0 0  WBC    ABSOLUTE NRBC 0.00 0.00 - 0.01 K/uL    NEUTROPHILS 65 40 - 73 %    LYMPHOCYTES 26 21 - 52 %    MONOCYTES 7 3 - 10 %    EOSINOPHILS 2 0 - 5 %    BASOPHILS 1 0 - 2 %    IMMATURE GRANULOCYTES 1 (H) 0.0 - 0.5 %    ABS. NEUTROPHILS 4.9 1.8 - 8.0 K/UL    ABS. LYMPHOCYTES 1.9 0.9 - 3.6 K/UL    ABS. MONOCYTES 0.5 0.05 - 1.2 K/UL    ABS. EOSINOPHILS 0.1 0.0 - 0.4 K/UL    ABS. BASOPHILS 0.1 0.0 - 0.1 K/UL    ABS. IMM.  GRANS. 0.0 0.00 - 0.04 K/UL    DF AUTOMATED     METABOLIC PANEL, COMPREHENSIVE    Collection Time: 08/31/22 12:07 PM   Result Value Ref Range    Sodium 141 136 - 145 mmol/L    Potassium 4.9 3.5 - 5.5 mmol/L    Chloride 110 100 - 111 mmol/L    CO2 29 21 - 32 mmol/L    Anion gap 2 (L) 3.0 - 18 mmol/L    Glucose 104 (H) 74 - 99 mg/dL    BUN 11 7.0 - 18 MG/DL    Creatinine 0.90 0.6 - 1.3 MG/DL    BUN/Creatinine ratio 12 12 - 20      GFR est AA >60 >60 ml/min/1.73m2    GFR est non-AA >60 >60 ml/min/1.73m2    Calcium 9.3 8.5 - 10.1 MG/DL    Bilirubin, total 0.4 0.2 - 1.0 MG/DL    ALT (SGPT) 18 13 - 56 U/L    AST (SGOT) 14 10 - 38 U/L    Alk. phosphatase 105 45 - 117 U/L    Protein, total 8.0 6.4 - 8.2 g/dL    Albumin 4.1 3.4 - 5.0 g/dL    Globulin 3.9 2.0 - 4.0 g/dL    A-G Ratio 1.1 0.8 - 1.7     PROTHROMBIN TIME + INR    Collection Time: 08/31/22 12:07 PM   Result Value Ref Range    Prothrombin time 12.7 11.5 - 15.2 sec    INR 0.9 0.8 - 1.2     TYPE & SCREEN    Collection Time: 08/31/22 12:07 PM   Result Value Ref Range    Crossmatch Expiration 09/03/2022,2359     ABO/Rh(D) Verl Simper POSITIVE     Antibody screen NEG    EKG, 12 LEAD, INITIAL    Collection Time: 08/31/22 12:40 PM   Result Value Ref Range    Ventricular Rate 83 BPM    Atrial Rate 83 BPM    P-R Interval 146 ms    QRS Duration 98 ms    Q-T Interval 406 ms    QTC Calculation (Bezet) 477 ms    Calculated P Axis 30 degrees    Calculated R Axis 36 degrees    Calculated T Axis 4 degrees    Diagnosis       Normal sinus rhythm  Nonspecific ST abnormality  Abnormal ECG  No previous ECGs available  Confirmed by Mikey Wang MD, [facility] ([id number]) on 8/31/2022 4:38:31 PM           Radiologic Studies -   XR CHEST PORT   Final Result   No acute findings. XR GREAT TOE RT MIN 2 V   Final Result      Limited exam but apparent nondisplaced transverse fracture through the distal   phalanx of the first digit. Fracture may extend near the nailbed. Correlate   clinically for potential open fracture. Advanced osteoarthritis of the midfoot and forefoot as discussed. Findings which   can be associated with tarsal bossing noted. Please see report for additional details. XR FOOT RT AP/LAT    (Results Pending)   NC XR TECHNOLOGIST SERVICE    (Results Pending)         Medical Decision Making and ED Course   - I am the first and primary provider for this patient AND AM THE PRIMARY PROVIDER OF RECORD. - I reviewed the vital signs, available nursing notes, past medical history, past surgical history, family history and social history.     - Initial assessment performed. The patients presenting problems have been discussed, and the staff are in agreement with the care plan formulated and outlined with them. I have encouraged them to ask questions as they arise throughout their visit. Vital Signs-Reviewed the patient's vital signs. Patient Vitals for the past 24 hrs:   Temp Pulse Resp BP SpO2   08/31/22 1546 98.3 °F (36.8 °C) 79 16 (!) 162/104 98 %   08/31/22 1028 98.7 °F (37.1 °C) 91 18 138/86 95 %         Nursing notes and pertinent past medical history including imaging and labs have been reviewed (if available)    ED Course:     Noted fracture of distal phalanx, makes this an open fracture. Complicated laceration and open fracture, discussed with Dr. Valdez Horner of orthopedics who would like to take the patient to the OR for a washout. Patient made n.p.o., will give antibiotics, basic labs for preop. To be admitted to the OR. Provider Notes (Medical Decision Making):     MDM  Number of Diagnoses or Management Options  Open nondisplaced fracture of distal phalanx of right great toe, initial encounter  Diagnosis management comments: Well-appearing with significant laceration that will require repair, will obtain x-ray as well as this is likely fractured underneath. Will require bedside washout and repair.         _________________________________________________________________      This note was dictated utilizing Dragon voice recognition software. Unfortunately this leads to occasional typographical errors. I apologize in advance if the situation occurs. If questions occur please do not hesitate to contact me directly. Carol Gibson MD          Procedures and Critical Care   Performed by: Carol Gibson MD  Procedures         Carol Gibson MD      Diagnosis:   1.  Open nondisplaced fracture of distal phalanx of right great toe, initial encounter          Disposition: Discharge    Follow-up Information    None         Current Discharge Medication List CONTINUE these medications which have NOT CHANGED    Details   QUEtiapine (SEROquel) 25 mg tablet Take 25 mg by mouth three (3) times daily. divalproex ER (DEPAKOTE ER) 250 mg ER tablet TAKE 3 TABS BEDTIME FOR MOOD,BIPOLAR DISORD,CRNT EPISODE MIXED,SEVERE,W PSYCH FEATURES      lamoTRIgine (LaMICtal) 25 mg tablet       ALPRAZolam (XANAX) 1 mg tablet Take 1 mg by mouth two (2) times a day. DULoxetine (CYMBALTA) 60 mg capsule TAKE 1 CAP ONCE A DAY FOR DEPRESSION             Patient seen in the context of the Novel Coronavirus (COVID19) pandemic, utilizing contemporary protocols and evidence based on the most up to date available evidence, understanding that the current evidence has the potential to change as additional information becomes available. This note is dictated utilizing Dragon voice recognition software. Unfortunately this leads to occasional typographical errors using the voice recognition. I apologize in advance if the situation occurs. If questions occur please do not hesitate to contact me directly.     Damián De La Fuente MD

## 2022-08-31 NOTE — H&P
Orthopedic Admission History and Physical          Patient: Cristin Strickland  MRN: [id number]  SSN: [id number]   YOB: 1955  Age: 74 y.o. Sex: female       DOA: [unfilled]  LOS:  LOS: 0 days            SUBJECTIVE       Cristin Strickland is a 74 y.o. female who was walking today, and tripped as she missed a step, and hit the distal end of her right great toe on a concrete step. She sustained an open fracture, to the right great toe distal phalanx, and with a significant laceration of the right great toe at the proximal portion of the nail plate skin interface. She is seen by the ER, digital photographs were obtained on and seen on [employer] secure messaging system patient x-rays, 3 views right foot, AP, lateral bleak x-rays, today, shows essentially nondisplaced fracture of the right great toe distal phalanx at the wrist with soft tissue swelling`     25-year-old female who tripped and missed a concrete step as she tried to turn, stubbed her right great toe and noted a immediate pain and a laceration. EMS called for same. Patient notes that she is otherwise very healthy, does not take any blood thinners, is not diabetic etc.    Cristin Strickland   denied SOB, chest pain, light headedness, dizziness. CHART REVIEW     There are no problems to display for this patient. Past Medical History:   Diagnosis Date    Bipolar 2 disorder ([facility] [address]     Osteoarthritis       Past Surgical History:   Procedure Laterality Date    HX BREAST AUGMENTATION      HX HYSTERECTOMY        Prior to Admission medications    Medication Sig Start Date End Date Taking? Authorizing Provider   ALPRAZolam Danney Jose) 1 mg tablet Take 1 mg by mouth two (2) times a day. 9/22/21   Provider, Historical   QUEtiapine (SEROquel) 25 mg tablet Take 25 mg by mouth three (3) times daily.  9/22/21   Provider, Historical   divalproex ER (DEPAKOTE ER) 250 mg ER tablet TAKE 3 TABS BEDTIME FOR MOOD,BIPOLAR DISORD,CRNT EPISODE MIXED,SEVERE,W PSYCH FEATURES 9/20/21   Provider, Historical   DULoxetine (CYMBALTA) 60 mg capsule TAKE 1 CAP ONCE A DAY FOR DEPRESSION 9/20/21   Provider, Historical   lamoTRIgine (LaMICtal) 25 mg tablet  7/28/21   Provider, Historical     No current facility-administered medications for this encounter. Current Outpatient Medications   Medication Sig    ALPRAZolam (XANAX) 1 mg tablet Take 1 mg by mouth two (2) times a day. QUEtiapine (SEROquel) 25 mg tablet Take 25 mg by mouth three (3) times daily. divalproex ER (DEPAKOTE ER) 250 mg ER tablet TAKE 3 TABS BEDTIME FOR MOOD,BIPOLAR DISORD,CRNT EPISODE MIXED,SEVERE,W PSYCH FEATURES    DULoxetine (CYMBALTA) 60 mg capsule TAKE 1 CAP ONCE A DAY FOR DEPRESSION    lamoTRIgine (LaMICtal) 25 mg tablet       No Known Allergies   Social History     Tobacco Use    Smoking status: Never    Smokeless tobacco: Never   Substance Use Topics    Alcohol use: Yes     Comment: rarely      No family history on file. REVIEW OF SYSTEMS : 8/31/2022  ALL BELOW ARE Negative except : SEE HPI     As performed to the ER team    Review of Systems   Constitutional:  Negative for fatigue and fever. HENT:  Negative for sore throat and trouble swallowing. Eyes:  Negative for photophobia and visual disturbance. Respiratory:  Negative for cough and shortness of breath. Cardiovascular:  Negative for chest pain and palpitations. Gastrointestinal:  Negative for abdominal pain and diarrhea. Genitourinary:  Negative for dysuria and flank pain. Musculoskeletal:  Negative for gait problem and joint swelling. Skin:  Negative for pallor and rash. Neurological:  Negative for weakness and numbness.      OBJECTIVE EXAMINATION     Patient Vitals for the past 8 hrs:   BP Temp Pulse Resp SpO2 Height Weight   08/31/22 1028 138/86 98.7 °F (37.1 °C) 91 18 95 % 5' 4\" (1.626 m) 155 lb (70.3 kg)     Vitals:    08/31/22 1028   BP: 138/86   Pulse: 91   Resp: 18   Temp: 98.7 °F (37.1 °C)   SpO2: 95%   Weight: 155 lb (70.3 kg)   Height: 5' 4\" (1.626 m)      Temp (24hrs), Av.7 °F (37.1 °C), Min:98.7 °F (37.1 °C), Max:98.7 °F (37.1 °C)        Visit Vitals  /86   Pulse 91   Temp 98.7 °F (37.1 °C)   Resp 18   Ht 5' 4\" (1.626 m)   Wt 155 lb (70.3 kg)   SpO2 95%   BMI 26.61 kg/m²       Appearance: Alert, well appearing and pleasant patient who is in no distress, oriented to person, place/time, and who follows commands. Psychiatric: Affect and mood are appropriate. H EENT (2): Head normocephalic & atraumatic. Both pupils are round, non icteric sclera     Eye: EOM are intact and sclera are clear    Neck: ROM WNL   Hearings Intact   Respiratory: Breathing is unlabored without accessory chest muscle use  Cardiovascular/Peripheral Vascular: Normal Pulses to each  foot    Right foot: Digital photographs seen. There is an extensive wound, to the right great toe, going from the medial lateral aspect of the right great toe, and involving the proximal portion of the right great toe nail plate interface. Goes from medial to lateral at the proximal portion of the distal phalanx. Motor intact  Sensory: slight numbness to distal toe tip  Vascular: DP/PT intact         DIAGNOSTIC IMAGING / LABORATORY DATA      XR Results (most recent):  Results from Hospital Encounter encounter on 22    XR CHEST PORT    Narrative  EXAM: XR CHEST PORT    INDICATION: preop    COMPARISON: 2019. FINDINGS: A single view of the chest demonstrates clear lungs. The cardiac and  mediastinal contours and pulmonary vascularity are normal. Scoliosis and osseous  degenerative changes. No acute bone findings. .    Impression  No acute findings.        Labs: CMP:   Lab Results   Component Value Date/Time     2022 12:07 PM    K 4.9 2022 12:07 PM     2022 12:07 PM    CO2 29 2022 12:07 PM    AGAP 2 (L) 2022 12:07 PM     (H) 2022 12:07 PM    BUN 11 2022 12:07 PM    CREA 0.90 2022 12:07 PM    GFRAA >60 08/31/2022 12:07 PM    GFRNA >60 08/31/2022 12:07 PM    CA 9.3 08/31/2022 12:07 PM    ALB 4.1 08/31/2022 12:07 PM    TP 8.0 08/31/2022 12:07 PM    GLOB 3.9 08/31/2022 12:07 PM    AGRAT 1.1 08/31/2022 12:07 PM    ALT 18 08/31/2022 12:07 PM     CBC:   Lab Results   Component Value Date/Time    WBC 7.5 08/31/2022 12:07 PM    HGB 14.7 08/31/2022 12:07 PM    HCT 44.9 08/31/2022 12:07 PM     08/31/2022 12:07 PM     COAGS:   Lab Results   Component Value Date/Time    PTP 12.7 08/31/2022 12:07 PM    INR 0.9 08/31/2022 12:07 PM                 Informed consent:    I discussed the risks and benefits and potential adverse outcomes of both operative vs non operative treatment of open fx right great toe with the patient. Risks of operative intervention include but not limited to bleeding, infection, deep vein thrombosis, pulmonary embolism, death, limb length discrepancy, reflexive sympathetic dystrophy, fat embolism syndrome, damage to blood vessels and nerves,aisha incisional numbness, malunion, non-union, delayed union, failure of hardware, post traumatic arthritis, stroke, heart attack, and death. Amputation if infection is recalcitrant to all surgical and medical measures. Patient understands that infection may arise and may require numerous surgeries, plastic surgical intervention, and possible toe amputation. Risks of non-operative intervention includes but not limited to pain, malunion, non-union, gait disturbance, etc.  Pt understands and agrees to proceed with  operative intervention. Will tentatively schedule for OR for 8/31/2022  date. Assessment:     David Lyn has an open fracture to the right great toe first IP region and to the nail proximal nail plate interface, with nailbed and nail plate injury. Recommendation, is for urgent I&D of the right great toe, antibiotics. Plan:     1. Consent Pt.  For irrigation debridement, right great toe, nailbed repair, soft tissue repair. Patient been n.p.o. since 7 AM.  2.    Ancef given in the ER, tetanus given the ER  3.  Consent signed       vEi Bartlett MD  8/31/2022  4:49 PM

## 2022-08-31 NOTE — BRIEF OP NOTE
Brief Postoperative Note    Patient: Oni Longo  YOB: 1955  MRN: [id number]    Date of Procedure: 8/31/2022     Pre-Op Diagnosis: Right great toe open fracture, distal phalynx    Post-Op Diagnosis: Same as preoperative diagnosis. Procedure(s):  INCISION AND DRAINAGE LOWER EXTREMITY, nailbed repair, open reduction internal fixation of the right great toe distal phalanx    Surgeon(s):  Itz Pleitez MD    Surgical Assistant: Surg Asst-1: Caesar Brown    Anesthesia: General     IV FLUIDS:  see anesthesia log   Tourniquet Time: No tourniquet used, 15 minutes minutes     Estimated Blood Loss (mL): Minimal    Complications: None    Specimens: * No specimens in log *     Implants:   Implant Name Type Inv. Item Serial No.  Lot No. LRB No. Used Action   C WIRE FIX L5IN DIA0.062IN DBL END SPADE TIP - [id number]  C WIRE FIX L5IN DIA0.062IN DBL END SPADE TIP  [facility] [employer]WD [id number] Right 1 Implanted       Drains: * No LDAs found *    Findings: 1 fracture complex laceration right great toe, proximal phalanx toe fracture.   Nailbed injury    Electronically Signed by Evi Bartlett MD on 8/31/2022 at 4:51 PM

## 2022-08-31 NOTE — ED NOTES
Report given to OR, tech at bedside to transport pt upstairs. All belongings placed in clear belonging bag, given to pt on stretcher.

## 2022-08-31 NOTE — ANESTHESIA PREPROCEDURE EVALUATION
Relevant Problems   No relevant active problems       Anesthetic History   No history of anesthetic complications            Review of Systems / Medical History  Patient summary reviewed and pertinent labs reviewed    Pulmonary  Within defined limits                 Neuro/Psych         Psychiatric history     Cardiovascular  Within defined limits                Exercise tolerance: >4 METS     GI/Hepatic/Renal                Endo/Other        Arthritis     Other Findings              Physical Exam    Airway  Mallampati: III  TM Distance: 4 - 6 cm  Neck ROM: decreased range of motion   Mouth opening: Normal     Cardiovascular    Rhythm: regular  Rate: normal         Dental  No notable dental hx       Pulmonary  Breath sounds clear to auscultation               Abdominal  GI exam deferred       Other Findings            Anesthetic Plan    ASA: 2, emergent  Anesthesia type: general          Induction: Intravenous  Anesthetic plan and risks discussed with: Patient
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
## 2022-08-31 NOTE — Clinical Note
Status[de-identified] INPATIENT [101]   Type of Bed: Surgical [18]   Cardiac Monitoring Required?: No   Inpatient Hospitalization Certified Necessary for the Following Reasons: 3.  Patient receiving treatment that can only be provided in an inpatient setting (further clarification in H&P documentation)   Admitting Diagnosis: Open fracture of distal phalanx of right great toe [9228062]   Admitting Physician: Lori Mares [facility]   Attending Physician: Tsering Vargas   Estimated Length of Stay: 2 Midnights   Discharge Plan[de-identified] Home with Office Follow-up

## 2022-08-31 NOTE — OP NOTES
Patient: Chance Moran                MRN:@           SSN: [id number]   YOB: 1955         AGE: 74 y.o. SEX: female       OPERATIVE REPORT    Brief Postoperative Note     Patient: Chance Moran  YOB: 1955  MRN: [id number]     Date of Procedure: 8/31/2022      Pre-Op Diagnosis: Right great toe open fracture, distal phalynx     Post-Op Diagnosis: Same as preoperative diagnosis. Procedure(s):  INCISION AND DRAINAGE LOWER EXTREMITY, nailbed repair, open reduction internal fixation of the right great toe distal phalanx,  A wound debridement ( skin, subcutaneous fat, bone from fx site was debrided and lavaged. Surgeon(s):  Ang Hilton MD     Surgical Assistant: Surg Asst-1: Demar Montenegro     Anesthesia: General      IV FLUIDS:  see anesthesia log   Tourniquet Time: No tourniquet used, 15 minutes minutes      Estimated Blood Loss (mL): Minimal     Complications: None     Specimens: * No specimens in log *      Implants:   Implant Name Type Inv. Item Serial No.  Lot No. LRB No. Used Action   C WIRE FIX L5IN DIA0.062IN DBL END SPADE TIP - [id number]   C WIRE FIX L5IN DIA0.062IN DBL END SPADE TIP   [employer] [id number] Right 1 Implanted         Drains: * No LDAs found *     Findings: 1 fracture complex laceration right great toe, proximal phalanx toe fracture. Nailbed injury     Electronically Signed by Katherine Cunningham MD on 8/31/2022 at 4:51 PM                  OPERATIVE REPORT        DESCRIPTION OF PROCEDURE:     Patient taken to the operating room on 8/31/2022 . She has a complex laceration right great toe at the distal phalanx has open fracture transverse fracture, and stable fracture of the right great toe with nailbed injury. Recommendations for urgent irrigation debridement, stabilization of the fracture, repair of the nailbed.   She understands she may lose her great toenail plate, as it has to be removed, 2 in order to see and properly repair the nailbed. She understands with any injury to the nailbed, that the toenail plate may never grow back straight or smooth, and may grow undulated, and may not grow at all. . Regional block was not already performed to the right lower extremity prior to taking to the operating room. Maria E Blue was positioned lying supine. general anesthesiabody was conducted. With her being supine, the entire right lower extremities fully prepped and draped with Betadine scrub, abundant Betadine scrub and Betadine paint from the tip of her toes all the way up to her right thigh. Sterile usual typical standard drapes were used. A formal verbal  time out was conducted: identifying the patient, identifying the surgical location, reading the informed written signed consent for procedure, confirmation that  the patient did receive preoperative antibiotics and that my signature on the patient was visible at the surgical field. All members of the surgical team agreed to the consent process. I did not exsanguinate the right lower extremity. I placed a Penrose, as my tourniquet, to the right great toe. I extended my incision, proxy 5 mm, on the medial side. I thoroughly irrigated this open wound edges open wound went from medial to lateral at the just proximal to the nail plate interface, near the lunula, and his open fracture. The toe was unstable. A wound debridement ( skin, subcutaneous fat, bone from fx site was debrided and lavaged. After thorough irrigation with Irrisept irrigation (0.50%) Chlorhexidene was used for irrigation and with diluted Betadine that was left in the wound, for least 5 minutes, and after additional thorough thorough irrigation.   An open reduction of the fracture was conducted, using a a double-armed smooth C-wire placed in the middle of the right great toe distal phalanx, traversing the reducing anatomically, crossing the IP region, and engaging in crossing a portion of the MTP region. This pin was deliberately placed across these joints, to prevent inadvertent pullout of the pin, in the postoperative period. In order to give it the best chance of healing this fracture. After additional thorough irrigation, the nailbed was repaired, using 4-0 Monocryl undyed suture. The skin was then reapproximated Using 3-0 nylon, and a simple, and figure-of-eight interrupted knot tied fashion. Sterile dressings were then placed, discussed Xeroform, 4 x 4's, 1 ABD, one 4 inch Ace wrap, and a hard soled shoe. She was extubated and transferred to cover room stable condition. Correct needle count tape counts and instruments are noted document in chart. No complications.     Conrad Leigh MD  8/31/2022  7:18 PM

## 2022-08-31 NOTE — ED NOTES
Pt medicated per MAR, pre op EKG completed and given to MD to sign. Pt undressed and placed in gown. Awaiting OR or ortho to bedside.

## 2022-08-31 NOTE — PROGRESS NOTES
Patient: Marty Cedeño                MRN:@           SSN: [id number]   YOB: 1955         AGE: 74 y.o. SEX: female        PLAN  HPI //  EXAMINATION     Status post ORIF right great toe open fracture, nailbed injury, extensive laceration. Admit for48 hours s/p above surgery      PT, IV Antibiotics ceftriaxone and vancomycin, infectious disease consult ,pain control  Consultations for , home health care, PT, OT      ORTHOPAEDIC DISCHARGE PLAN     1. DISCHARGE DISPOSITION:  Home    2. FOLLOW UP RETURN TO OFFICE: 1 weeks    Call [phone] to confirm your appointment to see Dr. Jeremy Cannon. Pawel Taylor MD.   Follow up with Primary Care Provider in 7 to 10 days. 3. WEIGHT BEARING STATUS/ROM:    NO WEIGHT BEARING TO   to the Right LOWER EXTREMITY    4. ELEVATE the Right lower extremity on 1 pillow. Place the pillow horizontal so that no pressure is on the back of your heel    Please leave your current dressings and in place. Keep your dressings clean and dry to the: Right lower extremity      Please call [phone] ([address]) if any: fever, shakes, chills, intractable pain, or for any questions you have regarding your care/medical condition   1. If you experience any calf pain or swelling, or are having any shortness of  breath, chest pain, or extremity swelling, or bleeding thru any surgical dressings,  or Bleeding at any body location while you are taking on any blood thinners. ie (mouth,nose, skin sites:)   2. Please go to closest ER  ASAP for assessment to rule out a leg clot and to  assess any  bleeding.     3. After general anesthesia or intravenous sedation, for 24 hours or while taking  prescription Narcotics:      [x]  Limit your activities     [x]   Do not drive and operate hazardous machinery    [x]   Do not make important personal or business decisions    [x]   Do  not drink alcoholic beverages    [x]  If you have not urinated within 8 hours after discharge, please contact    your surgeon on call. Report the following to your surgeon: If any below is true         [x]   Excessive pain, swelling, redness or odor of or around the surgical area       [x]   Temperature over 100.5       [x]  Nausea and vomiting lasting longer than 4 hours or if unable to take medications       [x]    Any signs of decreased circulation or nerve impairment to extremity:    Change in color, persistent  numbness, tingling, coldness or increase pain          [x]  No smoking/ No tobacco products/ Avoid exposure to second hand smoke    5. DIET:  Regular Diet  OTC (Nutritional supplements/multivitamins/calcium w/ Vitamin  D     6. ACTIVITY: No Driving, No lifting, twisting, squatting, deep bending. 7. INCISION CARE/DRESSINGS: Keep wound clean and dry ,Keep the current dressings on and in place. There is no need to change these current dressings    Keep all pets away from  any wound present in order to prevent infection. 8. PAIN CONTROL: Prescriptions written: Narcotics       Start taking your pain medications when you get home    9. VTE prophylaxis : SCD'S    10. ANTIBIOTICS: vancomycin and ceftriaxone  None at this time    11.  DME/ PRESCRIPTIONS WRITTEN ALREADY WRITTEN:     Home Care Equipment:         Crutches for home use               Past Medical History:   Diagnosis Date    Bipolar 2 disorder ([facility] [address]     Osteoarthritis       Past Surgical History:   Procedure Laterality Date    HX BREAST AUGMENTATION      HX HYSTERECTOMY        Social History     Socioeconomic History    Marital status:      Spouse name: Not on file    Number of children: Not on file    Years of education: Not on file    Highest education level: Not on file   Occupational History    Not on file   Tobacco Use    Smoking status: Never    Smokeless tobacco: Never   Substance and Sexual Activity    Alcohol use: Yes     Comment: rarely    Drug use: Never    Sexual activity: Not on file   Other Topics Concern    Not on file   Social History Narrative    Not on file     Social Determinants of Health     Financial Resource Strain: Not on file   Food Insecurity: Not on file   Transportation Needs: Not on file   Physical Activity: Not on file   Stress: Not on file   Social Connections: Not on file   Intimate Partner Violence: Not on file   Housing Stability: Not on file      History reviewed. No pertinent family history. Prior to Admission medications    Medication Sig Start Date End Date Taking? Authorizing Provider   QUEtiapine (SEROquel) 25 mg tablet Take 25 mg by mouth three (3) times daily. 9/22/21  Yes Provider, Historical   divalproex ER (DEPAKOTE ER) 250 mg ER tablet TAKE 3 TABS BEDTIME FOR MOOD,BIPOLAR DISORD,CRNT EPISODE MIXED,SEVERE,W PSYCH FEATURES 9/20/21  Yes Provider, Historical   lamoTRIgine (LaMICtal) 25 mg tablet  7/28/21  Yes Provider, Historical   ALPRAZolam (XANAX) 1 mg tablet Take 1 mg by mouth two (2) times a day.   Patient not taking: Reported on 8/31/2022 9/22/21   Provider, Historical   DULoxetine (CYMBALTA) 60 mg capsule TAKE 1 CAP ONCE A DAY FOR DEPRESSION  Patient not taking: Reported on 8/31/2022 9/20/21   Provider, Historical     Current Facility-Administered Medications   Medication Dose Route Frequency    morphine injection 4 mg  4 mg IntraVENous Q4H PRN    ondansetron (ZOFRAN) injection 4 mg  4 mg IntraVENous Q6H PRN    sodium chloride (NS) flush 5-40 mL  5-40 mL IntraVENous Q8H    sodium chloride (NS) flush 5-40 mL  5-40 mL IntraVENous PRN    lactated Ringers infusion  75 mL/hr IntraVENous CONTINUOUS    HYDROmorphone (DILAUDID) injection 0.5 mg  0.5 mg IntraVENous Multiple    diphenhydrAMINE (BENADRYL) injection 12.5 mg  12.5 mg IntraVENous Multiple    nalbuphine (NUBAIN) injection 5 mg  5 mg IntraVENous Multiple    ALPRAZolam (XANAX) tablet 1 mg  1 mg Oral BID    [START ON 9/1/2022] lamoTRIgine (LaMICtal) tablet 25 mg  25 mg Oral DAILY    QUEtiapine (SEROquel) tablet 25 mg  25 mg Oral TID    [START ON 9/1/2022] divalproex ER (DEPAKOTE ER) 24 hour tablet 250 mg  250 mg Oral DAILY    sodium chloride (NS) flush 5-40 mL  5-40 mL IntraVENous Q8H    sodium chloride (NS) flush 5-40 mL  5-40 mL IntraVENous PRN    naloxone (NARCAN) injection 0.4 mg  0.4 mg IntraVENous PRN    oxyCODONE-acetaminophen (PERCOCET 7.5) 7.5-325 mg per tablet 1 Tablet  1 Tablet Oral Q4H PRN    oxyCODONE IR (ROXICODONE) tablet 10 mg  10 mg Oral Q4H PRN    HYDROmorphone (DILAUDID) injection 1 mg  1 mg IntraVENous Q4H PRN    cefTRIAXone (ROCEPHIN) 2 g in sterile water (preservative free) 20 mL IV syringe  2 g IntraVENous Q24H    vancomycin (VANCOCIN) 1,000 mg in 0.9% sodium chloride 250 mL (VIAL-MATE)  1,000 mg IntraVENous ONCE     No Known Allergies     The patient has been experiencing pain and discomfort confirmed as outlined in the pain assessment outlined below. No flowsheet data found. Adan Gomez  has a past medical history of Bipolar 2 disorder ([address] and Osteoarthritis.      No results found for: HBA1C, KSL5GTLQ     Lab Results   Component Value Date/Time    Glucose 104 (H) 08/31/2022 12:07 PM    Glucose,  (H) 12/11/2019 09:08 PM        No results found for: HBA1C, SWP6NPCV, RMG9YXTR, OJX5LYIW      No results found for: VITD3, XQVID2, XQVID3, XQVID, VD3RIA, KBUE14SPIDF        EXAMINATION        Visit Vitals  /87   Pulse 88   Temp 97.9 °F (36.6 °C)   Resp 25   Ht 5' 4\" (1.626 m)   Wt 155 lb (70.3 kg)   SpO2 92%   BMI 26.61 kg/m²         Wyatt Taylor MD  8/31/2022  7:29 PM

## 2022-09-01 LAB
ANION GAP SERPL CALC-SCNC: 4 MMOL/L (ref 3–18)
BUN SERPL-MCNC: 16 MG/DL (ref 7–18)
BUN/CREAT SERPL: 17 (ref 12–20)
CALCIUM SERPL-MCNC: 8.5 MG/DL (ref 8.5–10.1)
CHLORIDE SERPL-SCNC: 107 MMOL/L (ref 100–111)
CO2 SERPL-SCNC: 29 MMOL/L (ref 21–32)
CREAT SERPL-MCNC: 0.93 MG/DL (ref 0.6–1.3)
GLUCOSE SERPL-MCNC: 100 MG/DL (ref 74–99)
POTASSIUM SERPL-SCNC: 4.5 MMOL/L (ref 3.5–5.5)
SODIUM SERPL-SCNC: 140 MMOL/L (ref 136–145)

## 2022-09-01 PROCEDURE — 65270000029 HC RM PRIVATE

## 2022-09-01 PROCEDURE — 36415 COLL VENOUS BLD VENIPUNCTURE: CPT

## 2022-09-01 PROCEDURE — 74011000250 HC RX REV CODE- 250: Performed by: ORTHOPAEDIC SURGERY

## 2022-09-01 PROCEDURE — 80048 BASIC METABOLIC PNL TOTAL CA: CPT

## 2022-09-01 PROCEDURE — 2709999900 HC NON-CHARGEABLE SUPPLY

## 2022-09-01 PROCEDURE — 97162 PT EVAL MOD COMPLEX 30 MIN: CPT

## 2022-09-01 PROCEDURE — 97166 OT EVAL MOD COMPLEX 45 MIN: CPT

## 2022-09-01 PROCEDURE — 97535 SELF CARE MNGMENT TRAINING: CPT

## 2022-09-01 PROCEDURE — 74011250636 HC RX REV CODE- 250/636: Performed by: ORTHOPAEDIC SURGERY

## 2022-09-01 PROCEDURE — 99024 POSTOP FOLLOW-UP VISIT: CPT | Performed by: ORTHOPAEDIC SURGERY

## 2022-09-01 PROCEDURE — 74011250636 HC RX REV CODE- 250/636: Performed by: INTERNAL MEDICINE

## 2022-09-01 PROCEDURE — 97116 GAIT TRAINING THERAPY: CPT

## 2022-09-01 PROCEDURE — 74011250637 HC RX REV CODE- 250/637: Performed by: ORTHOPAEDIC SURGERY

## 2022-09-01 RX ORDER — VANCOMYCIN HYDROCHLORIDE
1250 EVERY 24 HOURS
Status: DISCONTINUED | OUTPATIENT
Start: 2022-09-02 | End: 2022-09-02

## 2022-09-01 RX ORDER — VANCOMYCIN/0.9 % SOD CHLORIDE 1.5G/250ML
1500 PLASTIC BAG, INJECTION (ML) INTRAVENOUS ONCE
Status: COMPLETED | OUTPATIENT
Start: 2022-09-01 | End: 2022-09-01

## 2022-09-01 RX ADMIN — DIVALPROEX SODIUM 250 MG: 250 TABLET, FILM COATED, EXTENDED RELEASE ORAL at 08:44

## 2022-09-01 RX ADMIN — SODIUM CHLORIDE, PRESERVATIVE FREE 10 ML: 5 INJECTION INTRAVENOUS at 21:05

## 2022-09-01 RX ADMIN — QUETIAPINE FUMARATE 25 MG: 25 TABLET ORAL at 21:04

## 2022-09-01 RX ADMIN — LAMOTRIGINE 25 MG: 25 TABLET ORAL at 08:44

## 2022-09-01 RX ADMIN — OXYCODONE HYDROCHLORIDE AND ACETAMINOPHEN 1 TABLET: 7.5; 325 TABLET ORAL at 21:04

## 2022-09-01 RX ADMIN — QUETIAPINE FUMARATE 25 MG: 25 TABLET ORAL at 15:24

## 2022-09-01 RX ADMIN — WATER 2 G: 1 INJECTION INTRAMUSCULAR; INTRAVENOUS; SUBCUTANEOUS at 21:05

## 2022-09-01 RX ADMIN — SODIUM CHLORIDE, PRESERVATIVE FREE 10 ML: 5 INJECTION INTRAVENOUS at 06:25

## 2022-09-01 RX ADMIN — SODIUM CHLORIDE 1500 MG: 9 INJECTION, SOLUTION INTRAVENOUS at 15:24

## 2022-09-01 RX ADMIN — QUETIAPINE FUMARATE 25 MG: 25 TABLET ORAL at 08:44

## 2022-09-01 RX ADMIN — ALPRAZOLAM 1 MG: 0.5 TABLET ORAL at 08:44

## 2022-09-01 RX ADMIN — OXYCODONE HYDROCHLORIDE AND ACETAMINOPHEN 1 TABLET: 7.5; 325 TABLET ORAL at 15:24

## 2022-09-01 RX ADMIN — OXYCODONE HYDROCHLORIDE AND ACETAMINOPHEN 1 TABLET: 7.5; 325 TABLET ORAL at 10:25

## 2022-09-01 RX ADMIN — SODIUM CHLORIDE, PRESERVATIVE FREE 10 ML: 5 INJECTION INTRAVENOUS at 13:55

## 2022-09-01 RX ADMIN — ALPRAZOLAM 1 MG: 0.5 TABLET ORAL at 17:15

## 2022-09-01 NOTE — PROGRESS NOTES
Problem: Pain  Goal: *Control of Pain  Outcome: Progressing Towards Goal     Problem: Patient Education: Go to Patient Education Activity  Goal: Patient/Family Education  Outcome: Progressing Towards Goal     Problem: Falls - Risk of  Goal: *Absence of Falls  Description: Document Jak Everett Fall Risk and appropriate interventions in the flowsheet.   Outcome: Progressing Towards Goal  Note: Fall Risk Interventions:  Mobility Interventions: Bed/chair exit alarm         Medication Interventions: Bed/chair exit alarm    Elimination Interventions: Call light in reach, Patient to call for help with toileting needs    History of Falls Interventions: Bed/chair exit alarm, Evaluate medications/consider consulting pharmacy, Room close to nurse's station         Problem: Patient Education: Go to Patient Education Activity  Goal: Patient/Family Education  Outcome: Progressing Towards Goal

## 2022-09-01 NOTE — DISCHARGE SUMMARY
DISCHARGE SUMMARY               Patient: Adan Gomez  MRN: [id number]  SSN: [id number]   YOB: 1955  Age: 74 y.o. Sex: female       Admit Date: 8/31/2022 LOS:  LOS: 9 days    Discharge Date: 9/9/2022   POD #   S/P Procedure(s):  INCISION AND DRAINAGE LOWER EXTREMITY AND ORIF OF RIGHT GREAT TOE    Admitting MD: Wyatt Taylor MD   Consultants: Infectious Disease    Dr Reagan Huerta MD (ID service)   Admission Diagnoses: Open fracture of distal phalanx of right great toe [S92.421B]  Discharge Diagnoses:    Problem List as of 9/9/2022 Date Reviewed: 10/6/2021            Codes Class Noted - Resolved    * (Principal) Open fracture of distal phalanx of right great toe ICD-10-CM: S92.421B  ICD-9-CM: 826.1  8/31/2022 - Present         Discharge Condition: Good  Discharging Physician:  Wyatt Taylor MD     I spoke with KIRK SANTIAGO  /  Roxy Kapoor. [facility]. 9/9/2022 11:34 AM      DISCHARGE PLAN      DISCHARGE DISPOSITION:  Home  FOLLOW UP RETURN TO OFFICE: 1 weeks    Call [phone] to confirm your appointment. Follow up with Primary Care Provider in 7 to 10 days. WEIGHT BEARING STATUS/ROM: Nonweightbearing, right lower extremity  DIET:  Regular Diet  OTC Nutritional supplements, multivitamins, calcium w/ Vitamin  D   ACTIVITY: Use crutches, partial weightbearing, right heel with walker  right lower extremity. No lifting, twisting, squatting, deep bending. Elevate the right lower extremity region  INCISION CARE/DRESSINGS: Home health care instructions//wash your hands, remove the dressings, use dermal spray to gently sprayed onto the great toe, as the dressings might be adherent to the right great toe nailbed. Gently soak the dressing off, then placed the following dressings: Nonadherent Telfa, sterile 4 x 4's, one 4 inch Kerlix, one 4 inch Ace wrap. Leave the nylon sutures alone, leave the pins alone, respectively. ,       Keep all pets away from  any wound present in order to prevent infection. PAIN CONTROL: Prescriptions written: Norco 7.5 mg 1 p.o. 3 times daily as needed severe pain this to be sent to her pharmacy  VTE prophylaxis :  No chemotherapy daily anticoagulation agents are needed at this time  //sequential compression devices while in the hospital.  Encourage opposite leg (ankle DF/PF motion for endogenous fibrinolytic activity)  ANTIBIOTICS:   Recommendations:     Recommend ceftriaxone, vancomycin for now  Monitor right foot for cellulitis/wound infection  Will switch to po cefuroxime 500 mg po bid, doxycycline 100 mg po bid till 9/12/22 if no worsening erythema/drainage noted tomorrow. Risk of complicated infection despite oral antibiotics, need for surgery/amputation with deeper infection discussed with patient. She verbalized her understanding & has been anxious/in tears that her toenail may never grow back. Case discussed with:  [x]Patient  []Family  [x]Nursing  [x]Case Management    DISCHARGE MEDICATIONS      Current Discharge Medication List        CONTINUE these medications which have NOT CHANGED    Details   QUEtiapine (SEROquel) 25 mg tablet Take 25 mg by mouth three (3) times daily. divalproex ER (DEPAKOTE ER) 250 mg ER tablet TAKE 3 TABS BEDTIME FOR MOOD,BIPOLAR DISORD,CRNT EPISODE MIXED,SEVERE,W PSYCH FEATURES      lamoTRIgine (LaMICtal) 25 mg tablet       ALPRAZolam (XANAX) 1 mg tablet Take 1 mg by mouth two (2) times a day.       DULoxetine (CYMBALTA) 60 mg capsule TAKE 1 CAP ONCE A DAY FOR DEPRESSION             Orders Placed This Encounter    XR GREAT TOE RT MIN 2 V     Standing Status:   Standing     Number of Occurrences:   1     Order Specific Question:   Transport     Answer:   Stretcher [5]     Order Specific Question:   Reason for Exam     Answer:   fracture/laceration    XR CHEST PORT     Standing Status:   Standing     Number of Occurrences:   1     Order Specific Question:   Reason for Exam     Answer:   preop XR FOOT RT AP/LAT     Standing Status:   Standing     Number of Occurrences:   1     Order Specific Question:   Transport     Answer:   No Transport [3]     Order Specific Question:   Reason for Exam     Answer:   RIGHT GREAT TOE ORIF    NC XR TECHNOLOGIST SERVICE     Standing Status:   Standing     Number of Occurrences:   1     Order Specific Question:   Transport     Answer:   No Transport [3]     Order Specific Question:   Reason for Exam     Answer:   RIGHT GREAT TOE ORIF    CBC WITH AUTOMATED DIFF     Standing Status:   Standing     Number of Occurrences:   1    COMPREHENSIVE METABOLIC PANEL     Standing Status:   Standing     Number of Occurrences:   1    PROTHROMBIN TIME + INR     Standing Status:   Standing     Number of Occurrences:   1    METABOLIC PANEL, BASIC     Standing Status:   Standing     Number of Occurrences:   1    METABOLIC PANEL, BASIC     Standing Status:   Standing     Number of Occurrences:   1    ADULT DIET Regular     Standing Status:   Standing     Number of Occurrences:   1     Order Specific Question:   Primary Diet:     Answer:   Regular    VERIFY CONSENT HAS BEEN OBTAINED Irrigation and lavage, irrigate open fracture right foot, repair nailbed, nail plate ONE TIME Routine     Standing Status:   Standing     Number of Occurrences:   1     Order Specific Question:   Please describe the test or procedure you would like to order. Answer:   Irrigation and lavage, irrigate open fracture right foot, repair nailbed, nail plate    NEURO/VASCULAR CHECKS     Standing Status:   Standing     Number of Occurrences:   1    NOTIFY PROVIDER: VITAL SIGNS CHANGES     Standing Status:   Standing     Number of Occurrences:   1     Order Specific Question:   Notify for Temperature: Answer:   Greater than 80 F     Order Specific Question:   Notify for Heart Rate: Answer:   Greater than 120 bpm or Less than 60 bpm     Order Specific Question:   Notify for Respiratory Rate:      Answer: Greater than 30 per minute or Less than 8 per minute. Order Specific Question:   Notify for Systolic Blood Pressure: Answer:   Greater than 180 mmHg or Less than 90 mmHg     Order Specific Question:   Notify for Oxygen Saturation:     Answer:   Less than 90%     Order Specific Question:   Notify for Urine Output: Answer:   Less than 120 mL for 4 hours    MAINTAIN SEQUENTIAL COMPRESSION DEVICE     Standing Status:   Standing     Number of Occurrences:   1    VITAL SIGNS     Standing Status:   Standing     Number of Occurrences:   1    BEDREST WITH BATHROOM PRIVILEGES     Standing Status:   Standing     Number of Occurrences:   1    OUT OF BED IN CHAIR     Standing Status:   Standing     Number of Occurrences:   72774    OUT OF BED WITH ASSISTANCE     Standing Status:   Standing     Number of Occurrences:   02875    ELEVATE EXTREMITY Elevate foot for swelling and pain control. CONTINUOUS Routine     Elevate foot for swelling and pain control. Standing Status:   Standing     Number of Occurrences:   1    WEIGHT BEARING: SPECIFY No Weightbearing to the right lower extremity please  CONTINUOUS Routine     No Weightbearing to the right lower extremity please     Standing Status:   Standing     Number of Occurrences:   1    IP CONSULT TO INFECTIOUS DISEASES     49-year-old female who sustained a complex open fracture right great toe Lalo fracture goes medial to lateral to the right great toe distal phalanx. Transverse fracture proximal and of the distal phalanx with complex nail bed laceration and injury. Status post thorough irrigation of the wound with Betadine, Irrisept irrigation (0.50%) Chlorhexidene was used for irrigation. Fixation, right great toe, from the distal phalanx across the IP joint, and engaging in across the MTP joint (intentionally positioned in this way, to help prevent end advertent plot of the pain in the postoperative period). Plan to remove the pin, in about 4 weeks time. Standing Status:   Standing     Number of Occurrences:   1     Order Specific Question:   Reason for Consult: Answer:   Open wound open fracture right great toe     Order Specific Question:   Did you call or speak to the consulting provider? Answer:   Yes    OT--EVAL, DEVISE PLAN OF CARE AND TREAT     Standing Status:   Standing     Number of Occurrences:   1    PT--EVAL, DEVISE PLAN OF CARE AND TREAT     NO WEIGHT RIGHT LEG     Standing Status:   Standing     Number of Occurrences:   1    PT--EVAL, DEVISE PLAN OF CARE AND TREAT     Please dispense a pair of crutches for discharge please     Standing Status:   Standing     Number of Occurrences:   1    GLUCOSE, POC     Standing Status:   Standing     Number of Occurrences:   1    EKG, 12 LEAD, INITIAL     Standing Status:   Standing     Number of Occurrences:   1     Order Specific Question:   Reason for Exam:     Answer:   preop    TYPE & SCREEN     ENTER SURGERY DATE IF FOR PRE-OP TESTING. Standing Status:   Standing     Number of Occurrences:   1     Order Specific Question:   Has patient been transfused or pregnant in the last 3 mos. ? Answer:   Unknown    SALINE LOCK IV ONE TIME STAT     Standing Status:   Standing     Number of Occurrences:   1    DISCHARGE PATIENT     DC TO SNF //WHEN SNF IS AVAILABLE      INSTRUCTIONS    1. Change dressings, every other day. Remove the sterile dressings. Wash her hands. Placed sterile Telfa nonadherent Telfa, 4 x 4's, on fluffed 4 x 4's, gentle 4 inch Kerlix, 4 inch Ace wrap, hard soled shoe. 2.  After the dressings were removed, sterile dermal spray or , to the right great toe. Blot this area dry, then placed the sterile dressings as above.      Standing Status:   Standing     Number of Occurrences:   1    diph,Pertuss(AC),Tet Vac-PF (BOOSTRIX) suspension 0.5 mL    DISCONTD: ceFAZolin (ANCEF) 2 g in sterile water (preservative free) 20 mL IV syringe     Order Specific Question:   Antibiotic Indications     Answer:   Skin and Soft Tissue Infection    DISCONTD: morphine injection 4 mg    ondansetron (ZOFRAN) injection 4 mg    ceFAZolin (ANCEF) 2 g in sterile water (preservative free) 20 mL IV syringe     Order Specific Question:   Antibiotic Indications     Answer:   Skin and Soft Tissue Infection    DISCONTD: lactated Ringers infusion    famotidine (PF) (PEPCID) 20 mg in 0.9% sodium chloride 10 mL injection     Order Specific Question:   H2RA INDICATION     Answer:   SUP Prophylaxis    DISCONTD: sodium chloride (NS) flush 5-40 mL    DISCONTD: sodium chloride (NS) flush 5-40 mL    DISCONTD: lactated Ringers infusion    HYDROmorphone (DILAUDID) injection 0.5 mg    ondansetron (ZOFRAN) injection 4 mg    DISCONTD: diphenhydrAMINE (BENADRYL) injection 12.5 mg    DISCONTD: nalbuphine (NUBAIN) injection 5 mg    ALPRAZolam (XANAX) tablet 1 mg     OP SIG:Take 1 mg by mouth two (2) times a day. Patient not taking: Reported on 8/31/2022      lamoTRIgine (LaMICtal) tablet 25 mg     . QUEtiapine (SEROquel) tablet 25 mg     OP SIG:Take 25 mg by mouth three (3) times daily. DISCONTD: divalproex ER (DEPAKOTE ER) 24 hour tablet 250 mg     OP SIG:TAKE 3 TABS BEDTIME FOR MOOD,BIPOLAR DISORD,CRNT EPISODE MIXED,SEVERE,W PSYCH FEATURES      sodium chloride (NS) flush 5-40 mL    sodium chloride (NS) flush 5-40 mL    naloxone (NARCAN) injection 0.4 mg    oxyCODONE-acetaminophen (PERCOCET 7.5) 7.5-325 mg per tablet 1 Tablet    oxyCODONE IR (ROXICODONE) tablet 10 mg    HYDROmorphone (DILAUDID) injection 1 mg    cefTRIAXone (ROCEPHIN) 2 g in sterile water (preservative free) 20 mL IV syringe     Order Specific Question:   Antibiotic Indications     Answer: Other     Order Specific Question:   Other Abx Indication     Answer:   open wound great toe    DISCONTD: vancomycin (VANCOCIN) 1,000 mg in 0.9% sodium chloride 250 mL (VIAL-MATE)     Order Specific Question:   Antibiotic Indications     Answer:    Other     Order Specific Question:   Other Abx Indication     Answer:   pharmacy to dose     Order Specific Question:   Suspected Organism(s)     Answer:   open great toe wound    vancomycin (VANCOCIN) 1500 mg in  ml infusion     Order Specific Question:   Antibiotic Indications     Answer:   Skin and Soft Tissue Infection    DISCONTD: vancomycin (VANCOCIN) 1250 mg in  ml infusion     Order Specific Question:   Antibiotic Indications     Answer:   Skin and Soft Tissue Infection    cefUROXime (CEFTIN) 500 mg tablet     Sig: Take 1 Tablet by mouth two (2) times a day. Dispense:  20 Tablet     Refill:  0    doxycycline (ADOXA) 100 mg tablet     Sig: Take 1 Tablet by mouth two (2) times a day. Dispense:  20 Tablet     Refill:  0    oxyCODONE-acetaminophen (Percocet) 7.5-325 mg per tablet     Sig: Take 1 Tablet by mouth every four (4) hours as needed for Pain for up to 7 days. Max Daily Amount: 6 Tablets. Dispense:  40 Tablet     Refill:  0    cefUROXime (CEFTIN) tablet 500 mg     Order Specific Question:   Antibiotic Indications     Answer:   Skin and Soft Tissue Infection     Order Specific Question:   Skin duration of therapy     Answer: Other    doxycycline (VIBRAMYCIN) capsule 100 mg     Order Specific Question:   Antibiotic Indications     Answer:   Skin and Soft Tissue Infection     Order Specific Question:   Skin duration of therapy     Answer: Other    polyethylene glycol (MIRALAX) packet 17 g    divalproex ER (DEPAKOTE ER) 24 hour tablet 500 mg     OP SIG:TAKE 3 TABS BEDTIME FOR MOOD,BIPOLAR DISORD,CRNT EPISODE MIXED,SEVERE,W PSYCH FEATURES      Lactobacillus Acidoph & Bulgar (FLORANEX) tablet 2 Tablet    INITIAL PHYSICIAN ORDER: INPATIENT Surgical; No; 3. Patient receiving treatment that can only be provided in an inpatient setting (further clarification in H&P documentation)     Standing Status:   Standing     Number of Occurrences:   1     Order Specific Question:   Status:      Answer: INPATIENT [101]     Order Specific Question:   Type of Bed     Answer:   Surgical [18]     Order Specific Question:   Cardiac Monitoring Required? Answer:   No     Order Specific Question:   Inpatient Hospitalization Certified Necessary for the Following Reasons     Answer:   3. Patient receiving treatment that can only be provided in an inpatient setting (further clarification in H&P documentation)     Order Specific Question:   Admitting Diagnosis     Answer:   Open fracture of distal phalanx of right great toe [[id number]]     Order Specific Question:   Admitting Physician     Answer:   Suellen Martinez     Order Specific Question:   Attending Physician     Answer:   Suellen Martinez     Order Specific Question:   Estimated Length of Stay     Answer:   2 Midnights     Order Specific Question:   Discharge Plan:     Answer:   Home with Office Follow-up    IP Kennedy DE PAZ/ Nicolás Banuelos [facility]     1. Change dressings, every other day. Remove the sterile dressings. Wash her hands. Placed sterile Telfa nonadherent Telfa, 4 x 4's, on fluffed 4 x 4's, gentle 4 inch Kerlix, 4 inch Ace wrap, hard soled shoe. 2.  After the dressings were removed, sterile dermal spray or , to the right great toe. Blot this area dry, then placed the sterile dressings as above. Standing Status:   Standing     Number of Occurrences:   1     Order Specific Question:   Reason for Consult: Answer:   home health care    IP CONSULT TO CASE MANAGEMENT     Assess home needs     Standing Status:   Standing     Number of Occurrences:   1     Order Specific Question:   Reasons for Consult: Answer:   [facility] will order the necessary lab work, if needed, to complete the dosing and ongoing monitoring of requested drug therapy. Details will be updated within the patients Progress Notes.       Standing Status:   Standing     Number of Occurrences:   1 Order Specific Question:   Antibiotic Indications     Answer:   Skin and Soft Tissue Infection     Order Specific Question:   Skin duration of therapy     Answer: Other    IP CONSULT TO CASE MANAGEMENT     Standing Status:   Standing     Number of Occurrences:   1     Order Specific Question:   Reason for Consult: Answer:   will need SNF placement    IP CONSULT TO CASE MANAGEMENT     Dc to SNF when bed available    See me in 1 week  [facility] health care instructions are written already  She will need medical transportation to and from my office,     Standing Status:   Standing     Number of Occurrences:   1     Order Specific Question:   Reason for Consult: Answer:   patient only wants to go home so she can get a phone . this is not a safe discharge. please help with SNF placement. any way of getting patient a ? It is important that you take the medication exactly as they are prescribed. Keep your medication in the bottles provided by the pharmacist and keep a list of the medication names, dosages, and times to be taken in your wallet. Do not take other medications without consulting your doctor. Avoid constipation associated with Pain medication use by taking OTC Colace, Metamucil, Miralax or Milk of Magnesia      ADDITIONAL IMPORTANT INFORMATION      For any questions that you have regarding your medical care: Please call the [place] answering service [phone]  If you experience any calf pain/calf or leg swelling, chest pain, shortness of breath, extremity Swelling, bleeding through the dressings, Fever, chills/shakes,nausea, vomiting, diarrhea, change in mentation, intractable pain: the right foot looks good this am.    Go to the closest Emergency room ASAP for assessment or Call [phone] in order to coordinate your care.     HISTORY OF [address] COURSE       Halina Oliveros was admitted to [facility] on 8/31/2022 and the following consultations were made during this hospital stay: Infectious disease consultation,   Dr Alisa Milligan MD (ID service)      Emma Arenas tolerated the procedure well and was followed by  Emma Arenas on Orthopaedic service with consultations from : above listed. Additional Consultants : [x] PT and /or OT  [] Social Service  [] [facility]  [x]Case Management     Emma Arenas was placed on antibiotics, Ancef 1 dose in the ER, received tetanus, in the ER. As she is a severe open right great toe fracture. While in the hospital, she received receive IV vancomycin, as well as ceftriaxone. She was not placed on any type of chemoprophylaxis for DVT prophylaxis, as she is could be mobile. She does have sequential compression devices on her lower extremities. Emma Arenas IS VERY PREOCCUPIED WITH HER GREAT TOE NAIL PLATE. SHE UNDERSTANDS THAT THE NAIL PLATE MAY NOT GROW AS NORMAL DUE TO THE OPEN FRACTURE. Emma Arenas reports: No difficulty voiding urine      Vitals signs closely monitored and patient hemodynamically stable at discharge. The patient remained   afebrile. Julbensin Miriam Girish's pain has been well controlled with oral pain medications. There were no complications after the surgery. There were no transfusions with Blood products during this hospital stay. Significant Diagnostic Studies:  XR Results (most recent):  Results from Hospital Encounter encounter on 08/31/22    XR FOOT RT AP/LAT    Narrative  EXAMINATION: Right foot 2 views    INDICATION: Right great toe fixation    COMPARISON: 8/31/2022    FINDINGS: 2 limited intraoperative views of the right forefoot obtained,  demonstrating evidence of pin fixation of the great toe MTP and IP joints. Fracture fragments at the distal phalanx not as well delineated compared to  prior. First MTP hallux valgus and notable degenerative changes. Advanced second  MTP degenerative changes as well. Impression  Limited intraoperative images with findings as above.        Migue Pineda Lamberto Thibodeaux meets discharge criteria and is stable for DC from [facility]. EXAMINATION ON DISCHARGE DATE     Liliana Carias OFFERS the following: C/THAT  She is u    Vital signs at discharge: Visit Vitals  /66   Pulse 81   Temp 98.6 °F (37 °C)   Resp 18   Ht 5' 4\" (1.626 m)   Wt 155 lb (70.3 kg)   SpO2 98%   Breastfeeding No   BMI 26.61 kg/m²     Patient Vitals for the past 24 hrs:   BP Temp Pulse Resp SpO2 Height   09/09/22 1146 126/66 98.6 °F (37 °C) 81 18 98 % --   09/09/22 0400 130/76 97.4 °F (36.3 °C) 80 16 96 % --   09/09/22 0000 116/80 97.4 °F (36.3 °C) 83 16 97 % --   09/08/22 2000 134/79 97.4 °F (36.3 °C) 80 17 98 % --   09/08/22 1710 -- -- -- -- -- 5' 4\" (1.626 m)   09/08/22 1621 (!) 151/97 97.4 °F (36.3 °C) 86 20 97 % --       Intake/Output Summary (Last 24 hours) at 9/9/2022 1234  Last data filed at 9/8/2022 1710  Gross per 24 hour   Intake --   Output 1200 ml   Net -1200 ml       Liliana Carias is :alert, cooperative, no distress and denies any N/V/D/C, denies CP/SOB, denies UR/GI/ symptoms. .       Chest/Abdomen: No audible wheezing. No accessory use of chest muscles during breathing. Non tender abdomen. Psychiatric: Affect and mood are appropriate. Respiratory: Breathing is unlabored without accessory chest muscle use. EXTREMITY : Right lower extremity: Right foot:    DRESSINGS: Mild soilage present    INCISIONS:  Incision looks good, Mild erythema  Extremities:          No embolic phenomena to the toes                No significant edema to the foot and or toes.                 Pulses in tact to the left and right foot             Lower extremities are warm and appear well perfused         DVT: No evidence of DVT seen on examination at this time            Moves lower extremities well interms of :  (ankle DF/PF bilateral)         Moves Upper extremities well      LABS       HCT: @[id number](hct)@   CMP:   No results found for: NA, K, CL, CO2, AGAP, GLU, BUN, CREA, GFRAA, GFRNA, CA, MG, PHOS, ALB, TBIL, TP, ALB, GLOB, AGRAT, ALT    CBC:   No results found for: WBC, HGB, HGBEXT, HCT, HCTEXT, PLT, PLTEXT, HGBEXT, HCTEXT, PLTEXT    COAGS:   No results found for: APTT, PTP, INR, INREXT, INREXT    Results       ** No results found for the last 336 hours. **            CHRONIC MEDICAL DIAGNOSES:     Problem List as of 9/9/2022 Date Reviewed: 10/6/2021            Codes Class Noted - Resolved    * (Principal) Open fracture of distal phalanx of right great toe ICD-10-CM: S92.421B  ICD-9-CM: 826.1  8/31/2022 - Present           CHART REVIEW      Past Medical History:   Diagnosis Date    Bipolar 2 disorder ([facility] [address]     Osteoarthritis       Past Surgical History:   Procedure Laterality Date    HX BREAST AUGMENTATION      HX HYSTERECTOMY        Social History     Socioeconomic History    Marital status:      Spouse name: Not on file    Number of children: Not on file    Years of education: Not on file    Highest education level: Not on file   Occupational History    Not on file   Tobacco Use    Smoking status: Never    Smokeless tobacco: Never   Substance and Sexual Activity    Alcohol use: Yes     Comment: rarely    Drug use: Never    Sexual activity: Not on file   Other Topics Concern    Not on file   Social History Narrative    Not on file     Social Determinants of Health     Financial Resource Strain: Not on file   Food Insecurity: Not on file   Transportation Needs: Not on file   Physical Activity: Not on file   Stress: Not on file   Social Connections: Not on file   Intimate Partner Violence: Not on file   Housing Stability: Not on file      History reviewed. No pertinent family history. Prior to Admission medications    Medication Sig Start Date End Date Taking? Authorizing Provider   cefUROXime (CEFTIN) 500 mg tablet Take 1 Tablet by mouth two (2) times a day. 9/2/22  Yes Kingston Pleitez MD   doxycycline (ADOXA) 100 mg tablet Take 1 Tablet by mouth two (2) times a day.  9/2/22 Yes Sunny Gannon MD   oxyCODONE-acetaminophen (Percocet) 7.5-325 mg per tablet Take 1 Tablet by mouth every four (4) hours as needed for Pain for up to 7 days. Max Daily Amount: 6 Tablets. 9/2/22 9/9/22 Yes Itz Pleitez MD   QUEtiapine (SEROquel) 25 mg tablet Take 25 mg by mouth three (3) times daily. 9/22/21  Yes Provider, Historical   divalproex ER (DEPAKOTE ER) 250 mg ER tablet 500 mg nightly. 2 tablets @ bedtime 9/20/21  Yes Provider, Historical   lamoTRIgine (LaMICtal) 25 mg tablet  7/28/21  Yes Provider, Historical   ALPRAZolam (XANAX) 1 mg tablet Take 1 mg by mouth two (2) times a day.   Patient not taking: Reported on 8/31/2022 9/22/21   Provider, Historical   DULoxetine (CYMBALTA) 60 mg capsule TAKE 1 CAP ONCE A DAY FOR DEPRESSION  Patient not taking: Reported on 8/31/2022 9/20/21   Provider, Historical     Current Facility-Administered Medications   Medication Dose Route Frequency    Lactobacillus Acidoph & [facility]) tablet 2 Tablet  2 Tablet Oral BID    divalproex ER (DEPAKOTE ER) 24 hour tablet 500 mg  500 mg Oral DAILY    polyethylene glycol (MIRALAX) packet 17 g  17 g Oral DAILY    cefUROXime (CEFTIN) tablet 500 mg  500 mg Oral Q12H    doxycycline (VIBRAMYCIN) capsule 100 mg  100 mg Oral Q12H    ondansetron (ZOFRAN) injection 4 mg  4 mg IntraVENous Q6H PRN    ALPRAZolam (XANAX) tablet 1 mg  1 mg Oral BID    lamoTRIgine (LaMICtal) tablet 25 mg  25 mg Oral DAILY    QUEtiapine (SEROquel) tablet 25 mg  25 mg Oral TID    sodium chloride (NS) flush 5-40 mL  5-40 mL IntraVENous Q8H    sodium chloride (NS) flush 5-40 mL  5-40 mL IntraVENous PRN    naloxone (NARCAN) injection 0.4 mg  0.4 mg IntraVENous PRN    oxyCODONE-acetaminophen (PERCOCET 7.5) 7.5-325 mg per tablet 1 Tablet  1 Tablet Oral Q4H PRN    oxyCODONE IR (ROXICODONE) tablet 10 mg  10 mg Oral Q4H PRN    HYDROmorphone (DILAUDID) injection 1 mg  1 mg IntraVENous Q4H PRN     No Known Allergies          Evi Bartlett MD  9/9/2022  11:34 AM

## 2022-09-01 NOTE — ANESTHESIA POSTPROCEDURE EVALUATION
Procedure(s):  INCISION AND DRAINAGE LOWER EXTREMITY AND ORIF OF RIGHT GREAT TOE.     general    Anesthesia Post Evaluation      Multimodal analgesia: multimodal analgesia used between 6 hours prior to anesthesia start to PACU discharge  Patient location during evaluation: bedside  Patient participation: complete - patient participated  Level of consciousness: awake  Pain management: adequate  Airway patency: patent  Anesthetic complications: no  Cardiovascular status: stable  Respiratory status: acceptable  Hydration status: acceptable  Post anesthesia nausea and vomiting:  controlled      INITIAL Post-op Vital signs:   Vitals Value Taken Time   /74 08/31/22 2017   Temp 36.6 °C (97.9 °F) 08/31/22 1901   Pulse 76 08/31/22 2019   Resp 21 08/31/22 2019   SpO2 99 % 08/31/22 2019

## 2022-09-01 NOTE — PROGRESS NOTES
Problem: Self Care Deficits Care Plan (Adult)  Goal: *Acute Goals and Plan of Care (Insert Text)  Description: Occupational Therapy Goals  Initiated 9/1/2022 within 7 day(s). 1.  Patient will perform grooming with modified independence. 2.  Patient will perform bathing with modified independence. 3.  Patient will perform upper body dressing and lower body dressing with modified independence. 4.  Patient will perform toilet transfers with modified independence using RW while maintaining RLE NWB. 5.  Patient will perform all aspects of toileting with modified independence. 6.  Patient will participate in upper extremity therapeutic exercise/activities with modified independence for 8 minutes. 7.  Patient will utilize energy conservation techniques during functional activities with min verbal cues. Prior Level of Function: independent with ADLs and functional mobility w/o AD     Outcome: Progressing Towards Goal   OCCUPATIONAL THERAPY EVALUATION    Patient: Liliana Carias (12 y.o. female)  Date: 9/1/2022  Primary Diagnosis: Open fracture of distal phalanx of right great toe [S92.421B]  Procedure(s) (LRB):  INCISION AND DRAINAGE LOWER EXTREMITY AND ORIF OF RIGHT GREAT TOE (Right) 1 Day Post-Op   Precautions:  Fall, NWB, Skin (NWB RLE)    ASSESSMENT :  Nursing/RN cleared for pt to participate in OT evaluation and tx session. Patient was seen with PT to maximize patient safety, participation, and functional mobility in preparation for self-care tasks. Review of (R)LE NWB, dep don RLE post op shoe seated edge of bed. Patient requires max vc's for safety with RW and maintaining RLE NWB  during toilet transfer w/ Min A x 2, CGA toilet tasks. Pt sitting up in recliner chair at end of tx session, call bell within reach & pt verbalized understanding and provided return demonstration to utilize for assist e.g. functional transfers in order to prevent falls.  Nursing notified of pt's level of assist using RW for toilet and/or bedside commode transfer. Patient will benefit from skilled intervention to address the above impairments. Patient's rehabilitation potential is considered to be Good  Factors which may influence rehabilitation potential include:   []             None noted  []             Mental ability/status  [x]             Medical condition  []             Home/family situation and support systems  []             Safety awareness  []             Pain tolerance/management  []             Other:      PLAN :  Recommendations and Planned Interventions:   [x]               Self Care Training                  [x]      Therapeutic Activities  [x]               Functional Mobility Training   []      Cognitive Retraining  [x]               Therapeutic Exercises           [x]      Endurance Activities  [x]               Balance Training                    [x]      Neuromuscular Re-Education  []               Visual/Perceptual Training     [x]      Home Safety Training  [x]               Patient Education                   [x]      Family Training/Education  []               Other (comment):    Frequency/Duration: Patient will be followed by occupational therapy 3-5 times a week to address goals. Further Equipment Recommendations for Discharge: bedside commode, shower chair, and rolling walker    AMPAC: Based on an AM-PAC score of 16/24 and their current ADL deficits; it is recommended that the patient have 5-7 sessions per week of Occupational Therapy at d/c to increase the patient's independence. Currently, this patient demonstrates the potential endurance, and/or tolerance for 3 hours of therapy each day at d/c. This AMPAC score should be considered in conjunction with interdisciplinary team recommendations to determine the most appropriate discharge setting. Patient's social support, diagnosis, medical stability, and prior level of function should also be taken into consideration.      SUBJECTIVE:   Patient stated I am so upset that my toenail might not grow back.     OBJECTIVE DATA SUMMARY:     Past Medical History:   Diagnosis Date    Bipolar 2 disorder ([address]     Osteoarthritis      Past Surgical History:   Procedure Laterality Date    HX BREAST AUGMENTATION      HX HYSTERECTOMY       Barriers to Learning/Limitations: None  Compensate with: visual, verbal, tactile, kinesthetic cues/model    Home Situation:   Home Situation  Home Environment: Private residence  # Steps to Enter: 5  Rails to Enter: No  One/Two Story Residence: Two story  # of Interior Steps: 14  Living Alone: No  Support Systems: Friend/Neighbor (4 roomates)  Patient Expects to be Discharged to[de-identified] Home  Current DME Used/Available at Home: Shower chair  Tub or Shower Type: Tub/Shower combination  []  Right hand dominant   []  Left hand dominant    Cognitive/Behavioral Status:  Neurologic State: Alert  Orientation Level: Oriented X4  Cognition: Follows commands  Safety/Judgement: Fall prevention    Skin: R foot s/p pinning of hallux  Edema: RLE    Vision/Perceptual:  appears intact, able to tell correct time on wall clock  Corrective Lenses: Glasses    Coordination: BUE     Fine Motor Skills-Upper: Left Intact; Right Intact    Gross Motor Skills-Upper: Left Intact; Right Intact    Balance:  Sitting: Intact  Standing: Impaired; With support  Standing - Static: Fair (-)  Standing - Dynamic : Fair (-)    Strength: BUE WFL  Strength:  Within functional limits     Tone & Sensation: BUE  Tone: Normal     Range of Motion: BUE  AROM: Within functional limits     Functional Mobility and Transfers for ADLs:  Bed Mobility:     Supine to Sit: Contact guard assistance  Sit to Supine: Contact guard assistance  Scooting: Contact guard assistance  Transfers:  Sit to Stand: Minimum assistance  Stand to Sit: Minimum assistance      Toilet Transfer : Minimum assistance;Assist x2      ADL Assessment:   Feeding: Modified independent    Oral Facial Hygiene/Grooming: Stand-by assistance    Bathing: Contact guard assistance    Upper Body Dressing: Contact guard assistance    Lower Body Dressing: Contact guard assistance    Toileting: Contact guard assistance     ADL Intervention:  Grooming  Position Performed: Other (comment) (seated on toilet)  Washing Hands: Stand-by assistance (Sani hands)  Lower Body Dressing Assistance  Socks: Contact guard assistance  Leg Crossed Method Used: Yes  Position Performed: Seated in chair    Toileting  Bladder Hygiene: Contact guard assistance  Bowel Hygiene: Contact guard assistance    Cognitive Retraining  Safety/Judgement: Fall prevention    Pain:  Pain level pre-treatment: 8/10   Pain level post-treatment: 8/10   Pain Intervention(s): Medication (see MAR); Rest, Ice, Repositioning   Response to intervention: Nurse notified, See doc flow    Activity Tolerance:   fair  Please refer to the flowsheet for vital signs taken during this treatment. After treatment:   [x] Patient left in no apparent distress sitting up in chair  [] Patient left in no apparent distress in bed  [x] Call bell left within reach  [x] Nursing notified  [] Caregiver present  [] Bed alarm activated    COMMUNICATION/EDUCATION:   [x] Role of Occupational Therapy in the acute care setting  [x] Home safety education was provided and the patient/caregiver indicated understanding. [x] Patient/family have participated as able in goal setting and plan of care. [x] Patient/family agree to work toward stated goals and plan of care. [] Patient understands intent and goals of therapy, but is neutral about his/her participation. [] Patient is unable to participate in goal setting and plan of care. Thank you for this referral.  Rowe Phalen  Time Calculation: 29 mins    Eval Complexity: History: MEDIUM Complexity : Expanded review of history including physical, cognitive and psychosocial  history ;    Examination: MEDIUM Complexity : 3-5 performance deficits relating to physical, cognitive , or psychosocial skils that result in activity limitations and / or participation restrictions; Decision Making:MEDIUM Complexity : Patient may present with comorbidities that affect occupational performnce. Miniml to moderate modification of tasks or assistance (eg, physical or verbal ) with assesment(s) is necessary to enable patient to complete evaluation     [facility] AM-PAC® Daily Activity Inpatient Short Form (6-Clicks)*    How much HELP from another person does the patient currently need    (If the patient hasn't done an activity recently, how much help from another person do you think he/she would need if he/she tried?)   Total (Total A or Dep)   A Lot  (Mod to Max A)   A Little (Sup or Min A)   None (Mod I to I)   Putting on and taking off regular lower body clothing? [] 1 [x] 2 [] 3 [] 4   2. Bathing (including washing, rinsing,      drying)? [] 1 [x] 2 [] 3 [] 4   3. Toileting, which includes using toilet, bedpan or urinal?   [] 1 [] 2 [x] 3 [] 4   4. Putting on and taking off regular upper body clothing? [] 1 [] 2 [x] 3 [] 4   5. Taking care of personal grooming such as brushing teeth? [] 1 [] 2 [x] 3 [] 4   6. Eating meals?    [] 1 [] 2 [x] 3 [] 4

## 2022-09-01 NOTE — PROGRESS NOTES
Assumed care of patient from Estiven Perez RN(off going nurse.) Patient alert and oriented. No apparent distress noted. Patient requesting pain medication with morning medications. Rating pain 8/10 in right foot and can verbalize needs. Assessment to follow. Call bell within reach. Bed in lowest, locked position.

## 2022-09-01 NOTE — PROGRESS NOTES
Problem: Mobility Impaired (Adult and Pediatric)  Goal: *Acute Goals and Plan of Care (Insert Text)  Description: Physical Therapy Goals  Initiated 9/1/2022 and to be accomplished within 7 day(s)  1. Patient will move from supine to sit and sit to supine in bed with modified independence. 2.  Patient will transfer from bed to chair and chair to bed with modified independence using the least restrictive device. 3.  Patient will perform sit to stand with modified independence. 4.  Patient will ambulate with modified independence for 50 feet with the least restrictive device. 5.  Patient will ascend/descend 3 stairs with handrail(s) with minimal assistance/contact guard assist.    PLOF: Independent. Lives in boarding house on 2nd floor. 4 steps to enter; no handrails. Outcome: Progressing Towards Goal   PHYSICAL THERAPY EVALUATION    Patient: Sherrell Saha (30 y.o. female)  Date: 9/1/2022  Primary Diagnosis: Open fracture of distal phalanx of right great toe [S92.421B]  Procedure(s) (LRB):  INCISION AND DRAINAGE LOWER EXTREMITY AND ORIF OF RIGHT GREAT TOE (Right) 1 Day Post-Op   Precautions: Fall, NWB, Skin (NWB RLE)  ASSESSMENT :  Educated on NWB RLE; frequent cues to comply with mobility. Poor safety awareness and decreased insight to deficits and need for social support. Supervision for supine to sit. Seated EOB with good balance. Min A for sit to stand; non-compliance with NWB RLE. Amb 12ft with ww and min A d/t poor safety awareness with ww and non-compliance with NWB RLE. Max verbal cues for stand to sit to low commode. Min A for sit to stand from low commode. Amb 10ft with ww and min A to chair. Poor safety awareness with transfer to chair. Poor carryover on need for assistance d/t NWB RLE. Perseverates on anticipation of loosing toe nail. Seated in chair with BLE and RLE further elevated on pillow. At this time would recommend stair negotiation on buttocks for safety and compliance with RLE NWB. Chair alarm on. Educated on need for RN assistance with mobility; verbalized understanding. Call bell in reach. Patient will benefit from skilled intervention to address the above impairments. Patient's rehabilitation potential is considered to be Fair  Factors which may influence rehabilitation potential include:   []         None noted  []         Mental ability/status  [x]         Medical condition  []         Home/family situation and support systems  []         Safety awareness  []         Pain tolerance/management  []         Other:      PLAN :  Recommendations and Planned Interventions:   [x]           Bed Mobility Training             [x]    Neuromuscular Re-Education  [x]           Transfer Training                   []    Orthotic/Prosthetic Training  [x]           Gait Training                          []    Modalities  [x]           Therapeutic Exercises           []    Edema Management/Control  [x]           Therapeutic Activities            [x]    Family Training/Education  [x]           Patient Education  []           Other (comment):    Frequency/Duration: Patient will be followed by physical therapy 1-2 times per day/4-7 days per week to address goals. Further Equipment Recommendations for Discharge: rolling walker    AMPA Basic Mobility Inpatient Short Form:  17/24   This AMPAC score should be considered in conjunction with interdisciplinary team recommendations to determine the most appropriate discharge setting. Patient's social support, diagnosis, medical stability, and prior level of function should also be taken into consideration. Based on an AM-PAC score of 17/24 and current functional mobility deficits, it is recommended that the patient have 5-7 sessions per week of physical therapy at d/c to increase the patient's independence.   Currently, this patient demonstrates the potential endurance, and/or tolerance for 3 hours of therapy each day at d/c.     SUBJECTIVE:   Patient stated Oh goodness. I don't want a walker.     OBJECTIVE DATA SUMMARY:     Past Medical History:   Diagnosis Date    Bipolar 2 disorder ([address]     Osteoarthritis      Past Surgical History:   Procedure Laterality Date    HX BREAST AUGMENTATION      HX HYSTERECTOMY       Barriers to Learning/Limitations: yes;  cognitive  Compensate with: Visual Cues, Verbal Cues, Tactile Cues and Kinesthetic Cues    Home Situation:  Home Situation  Home Environment: Private residence  # Steps to Enter: 5  Rails to Enter: No  One/Two Story Residence: Two story  # of Interior Steps: 14  Living Alone: No  Support Systems: Friend/Neighbor (4 roomates)  Patient Expects to be Discharged to[de-identified] Home  Current DME Used/Available at Home: Shower chair  Tub or Shower Type: Tub/Shower combination    Critical Behavior:  Neurologic State: Alert  Orientation Level: Oriented X4    Strength:    Manual Muscle Testing (LE)         R     L    Hip Flexion:   5/5  5/5  Knee EXT:   5/5 5/5  Knee FLEX:   5/5 5/5  Ankle DF:     5/5  _________________________________________________   Range Of Motion:  BLE AROM WFL  Functional Mobility:  Bed Mobility:  Supine to Sit: Contact guard assistance  Sit to Supine: Contact guard assistance  Scooting: Contact guard assistance  Transfers:  Sit to Stand: Minimum assistance  Stand to Sit: Minimum assistance  Balance:   Sitting: Intact  Standing: Impaired; With support  Standing - Static: Fair (-)  Standing - Dynamic : Fair (-)  Ambulation/Gait Training:  Distance (ft):  (12ft, 10ft)   Assistive Device: Walker, rolling  Ambulation - Level of Assistance: Minimal assistance  Right Side Weight Bearing: Non-weight bearing  Stairs:   Educated on stair buttock negotiation for safety  Neuro Re-education:  Seated balance 8 minutes  Therapeutic Exercises:   Sit to stand x2  Pain:  Pain level pre-treatment: 8/10   Pain level post-treatment: 8/10     Activity Tolerance:   Fair    After treatment:   [x]         Patient left in no apparent distress sitting up in chair  []         Patient left in no apparent distress in bed  [x]         Call bell left within reach  [x]         Nursing notified  []         Caregiver present  [x]         Chair alarm activated  []         SCDs applied    COMMUNICATION/EDUCATION:   [x]         Role of physical therapy and plan of care in the acute care setting. [x]         Fall prevention education was provided and the patient/caregiver indicated understanding. [x]         Patient/family have participated as able in goal setting and plan of care. []         Patient/family agree to work toward stated goals and plan of care. []         Patient understands intent and goals of therapy, but is neutral about his/her participation. []         Patient is unable to participate in goal setting/plan of care: ongoing with therapy staff. Thank you for this referral.  Pernell Fink, PT   Time Calculation: 26 mins    Eval Complexity: History: MEDIUM  Complexity : 1-2 comorbidities / personal factors will impact the outcome/ POC Exam:MEDIUM Complexity : 3 Standardized tests and measures addressing body structure, function, activity limitation and / or participation in recreation  Presentation: MEDIUM Complexity : Evolving with changing characteristics  Clinical Decision Making:Medium Complexity    Clinical judgement; ROM, MMT, functional mobility Overall Complexity:MEDIUM    [facility] AM-PAC® Basic Mobility Inpatient Short Form (6-Clicks) Version 2    How much HELP from another person does the patient currently need    (If the patient hasn't done an activity recently, how much help from another person do you think he/she would need if he/she tried?)   Total (Total A or Dep)   A Lot  (Mod to Max A)   A Little (Sup or Min A)   None (Mod I to I)   Turning from your back to your side while in a flat bed without using bedrails? [] 1 [] 2 [x] 3 [] 4   2.  Moving from lying on your back to sitting on the side of a flat bed without using bedrails? [] 1 [] 2 [x] 3 [] 4   3. Moving to and from a bed to a chair (including a wheelchair)? [] 1 [] 2 [x] 3 [] 4   4. Standing up from a chair using your arms (e.g., wheelchair, or bedside chair)? [] 1 [] 2 [x] 3 [] 4   5. Walking in hospital room? [] 1 [] 2 [x] 3 [] 4   6. Climbing 3-5 steps with a railing?+   [] 1 [x] 2 [] 3 [] 4   +If stair climbing cannot be assessed, skip item #6. Sum responses from items 1-5.

## 2022-09-01 NOTE — PROGRESS NOTES
Bedside and Verbal shift change report given to Connie Rosado RN (oncoming nurse) by Abhishek Almonte RN (offgoing nurse). Report included the following information SBAR, Kardex, Intake/Output, MAR, and Recent Results.

## 2022-09-01 NOTE — PROGRESS NOTES
[address] [facility] Pharmacokinetic Monitoring Service - Vancomycin     Charlene Buchanan is a 74 y.o. female starting on vancomycin therapy for Skin and Soft Tissue Infection. Pharmacy consulted by Dr. Ronald Barrera for monitoring and adjustment. Target Concentration: Goal AUC/SANDRA 400-600 mg*hr/L    Additional Antimicrobials: Ceftriaxone    Pertinent Laboratory Values:   Temp: 97.3 °F (36.3 °C)  Weight: 70.3 kg (155 lb)  Recent Labs     09/01/22  1354 08/31/22  1207   CREA 0.93 0.90   BUN 16 11   WBC  --  7.5     Estimated Creatinine Clearance: 55.7 mL/min (based on SCr of 0.93 mg/dL). Plan:  Dosing recommendations based on Bayesian software  Start loading dose of Vancomycin 1500 mg x 1 followed by maintenance dose of Vancomycin 1250 mg q24h   Anticipated AUC of 460 and trough concentration of 13.1 at steady state  BMP for tomorrow AM    No level ordered at this time   Pharmacy will continue to monitor patient and adjust therapy as indicated    Thank you for the consult,  Kodi Cespedes.  EDY Joy  9/1/2022

## 2022-09-01 NOTE — ROUTINE PROCESS
Bedside and Verbal shift change report given to Brigida Bingham RN (oncoming nurse) by Mello Tolbert RN   (offgoing nurse). Report included the following information SBAR, Kardex, Intake/Output, MAR, and Recent Results.

## 2022-09-01 NOTE — PROGRESS NOTES
PROGRESS NOTE      Patient: Kelsey Marion  MRN: [id number]  SSN: [id number]   YOB: 1955  Age: 74 y.o. Sex: female     Admit Date: 8/31/2022 LOS:  LOS: 1 day      POD # 1 S/P Procedure(s):  INCISION AND DRAINAGE LOWER EXTREMITY AND ORIF OF RIGHT GREAT TOE  Open repair of nailbed    ASSESSMENT/PLAN     Kelsey Marion IS A 74 y.o. old who is resting at this time. She is getting ready to eat breakfast.  Her pain is controlled currently at this time. She did receive 1 IV, morphine, yesterday evening. She denies any fever shakes chills night sweats, shortness of breath headache dizziness. She understands be nonweightbearing, to this right lower extremity and that she will receive formal consults from infectious disease, as well as physical therapy. Social service consult  consult also placed. Anticipate her being in the hospital, for another 24 hours, hopefully she can be discharged tomorrow, on oral agents, as she had a severely open right great toe fracture with complex wound and is status post thorough irrigation lavage of the wound, ORIF with intramedullary large C-wire, repair of the nailbed, reapproximation of skin. Her dressings were removed today, dressings were moderately soiled. The toe was examined, is nice and pink, new dressings were placed: Sterile 4 x 4's, 4 inch Kerlix, 1 ABD, 1 Ace wrap. She understands she will require staged procedure: Removal of the pin, and approxi-4 weeks duration. Orthopaedic:  A. Pain Meds : Narcotics   B.  DVT Prophylaxis: SCD's,    C. IV AB: Antibiotics: vancomycin and ceftriaxone yes for open fracture right great toe  D.   Weight Bear: Nonweightbearing right lower extremity with use of crutches  E.  Physican's following patient: Consultants following patients: Infectious disease consult      F.  PT/OT/ Intervention/ Consults:    PT/OT : [x]PT / [x] OT ordered //nonweightbearing right lower extremity    DC disposition: Home,       [x]  []  [x] [place]       Visit Vitals  /83   Pulse 82   Temp 98 °F (36.7 °C)   Resp 18   Ht 5' 4\" (1.626 m)   Wt 155 lb (70.3 kg)   SpO2 97%   Breastfeeding No   BMI 26.61 kg/m²       Appearance: Alert, well appearing and pleasant patient who is in no distress, oriented to person, place/time, and who follows commands. This patient is accompanied in the examination room by her  self. no dementia  Psychiatric: Affect and mood are appropriate. Respiratory: Breathing is unlabored without accessory chest muscle use  Peripheral Vascular: Normal Pulses to each hand and foot    EXTREMITY lower extremity    DRESSINGS: Moderate soilage present  INCISIONS:  Incision looks good, No erythema  Wound: clean, tender, nailbed, is clean, slight slight dried blood along the dorsal or anterior nailbed. Pins in good position no redness around the pin  Extremities:        No embolic phenomena to the toes of the right forefoot              No significant edema to the right foot and or toes. Pulses in tact to the right foot             Lower extremities are warm and appear well perfused         DVT: No evidence of DVT seen on examination at this time            Moves left lower extremities well also has good movement to each bilateral ankles in terms of dorsiflexion plantarflexion.           Moves Upper extremities well    LABS AND MEDICATION REVIEW      CMP:   Lab Results   Component Value Date/Time     08/31/2022 12:07 PM    K 4.9 08/31/2022 12:07 PM     08/31/2022 12:07 PM    CO2 29 08/31/2022 12:07 PM    AGAP 2 (L) 08/31/2022 12:07 PM     (H) 08/31/2022 12:07 PM    BUN 11 08/31/2022 12:07 PM    CREA 0.90 08/31/2022 12:07 PM    GFRAA >60 08/31/2022 12:07 PM    GFRNA >60 08/31/2022 12:07 PM    CA 9.3 08/31/2022 12:07 PM    ALB 4.1 08/31/2022 12:07 PM    TP 8.0 08/31/2022 12:07 PM    GLOB 3.9 08/31/2022 12:07 PM    AGRAT 1.1 08/31/2022 12:07 PM    ALT 18 08/31/2022 12:07 PM     CBC:   Lab Results   Component Value Date/Time    WBC 7.5 08/31/2022 12:07 PM    HGB 14.7 08/31/2022 12:07 PM    HCT 44.9 08/31/2022 12:07 PM     08/31/2022 12:07 PM     COAGS:   Lab Results   Component Value Date/Time    PTP 12.7 08/31/2022 12:07 PM    INR 0.9 08/31/2022 12:07 PM        Current Facility-Administered Medications   Medication Dose Route Frequency    morphine injection 4 mg  4 mg IntraVENous Q4H PRN    ondansetron (ZOFRAN) injection 4 mg  4 mg IntraVENous Q6H PRN    ALPRAZolam (XANAX) tablet 1 mg  1 mg Oral BID    lamoTRIgine (LaMICtal) tablet 25 mg  25 mg Oral DAILY    QUEtiapine (SEROquel) tablet 25 mg  25 mg Oral TID    divalproex ER (DEPAKOTE ER) 24 hour tablet 250 mg  250 mg Oral DAILY    sodium chloride (NS) flush 5-40 mL  5-40 mL IntraVENous Q8H    sodium chloride (NS) flush 5-40 mL  5-40 mL IntraVENous PRN    naloxone (NARCAN) injection 0.4 mg  0.4 mg IntraVENous PRN    oxyCODONE-acetaminophen (PERCOCET 7.5) 7.5-325 mg per tablet 1 Tablet  1 Tablet Oral Q4H PRN    oxyCODONE IR (ROXICODONE) tablet 10 mg  10 mg Oral Q4H PRN    HYDROmorphone (DILAUDID) injection 1 mg  1 mg IntraVENous Q4H PRN    cefTRIAXone (ROCEPHIN) 2 g in sterile water (preservative free) 20 mL IV syringe  2 g IntraVENous Q24H          REVIEW OF SYSTEMS : 9/1/2022  ALL BELOW ARE Negative except : SEE HPI        Past Medical History:   Diagnosis Date    Bipolar 2 disorder ([address]     Osteoarthritis       Past Surgical History:   Procedure Laterality Date    HX BREAST AUGMENTATION      HX HYSTERECTOMY        Social History     Socioeconomic History    Marital status:      Spouse name: Not on file    Number of children: Not on file    Years of education: Not on file    Highest education level: Not on file   Occupational History    Not on file   Tobacco Use    Smoking status: Never    Smokeless tobacco: Never   Substance and Sexual Activity    Alcohol use: Yes     Comment: rarely    Drug use: Never    Sexual activity: Not on file   Other Topics Concern    Not on file   Social History Narrative    Not on file     Social Determinants of Health     Financial Resource Strain: Not on file   Food Insecurity: Not on file   Transportation Needs: Not on file   Physical Activity: Not on file   Stress: Not on file   Social Connections: Not on file   Intimate Partner Violence: Not on file   Housing Stability: Not on file      History reviewed. No pertinent family history. Prior to Admission medications    Medication Sig Start Date End Date Taking? Authorizing Provider   QUEtiapine (SEROquel) 25 mg tablet Take 25 mg by mouth three (3) times daily. 9/22/21  Yes Provider, Historical   divalproex ER (DEPAKOTE ER) 250 mg ER tablet TAKE 3 TABS BEDTIME FOR MOOD,BIPOLAR DISORD,CRNT EPISODE MIXED,SEVERE,W PSYCH FEATURES 9/20/21  Yes Provider, Historical   lamoTRIgine (LaMICtal) 25 mg tablet  7/28/21  Yes Provider, Historical   ALPRAZolam (XANAX) 1 mg tablet Take 1 mg by mouth two (2) times a day.   Patient not taking: Reported on 8/31/2022 9/22/21   Provider, Historical   DULoxetine (CYMBALTA) 60 mg capsule TAKE 1 CAP ONCE A DAY FOR DEPRESSION  Patient not taking: Reported on 8/31/2022 9/20/21   Provider, Historical     Current Facility-Administered Medications   Medication Dose Route Frequency    morphine injection 4 mg  4 mg IntraVENous Q4H PRN    ondansetron (ZOFRAN) injection 4 mg  4 mg IntraVENous Q6H PRN    ALPRAZolam (XANAX) tablet 1 mg  1 mg Oral BID    lamoTRIgine (LaMICtal) tablet 25 mg  25 mg Oral DAILY    QUEtiapine (SEROquel) tablet 25 mg  25 mg Oral TID    divalproex ER (DEPAKOTE ER) 24 hour tablet 250 mg  250 mg Oral DAILY    sodium chloride (NS) flush 5-40 mL  5-40 mL IntraVENous Q8H    sodium chloride (NS) flush 5-40 mL  5-40 mL IntraVENous PRN    naloxone (NARCAN) injection 0.4 mg  0.4 mg IntraVENous PRN    oxyCODONE-acetaminophen (PERCOCET 7.5) 7.5-325 mg per tablet 1 Tablet  1 Tablet Oral Q4H PRN    oxyCODONE IR (ROXICODONE) tablet 10 mg  10 mg Oral Q4H PRN    HYDROmorphone (DILAUDID) injection 1 mg  1 mg IntraVENous Q4H PRN    cefTRIAXone (ROCEPHIN) 2 g in sterile water (preservative free) 20 mL IV syringe  2 g IntraVENous Q24H     No Known Allergies        Rosalinda Mccurdy MD  9/1/2022  7:49 AM

## 2022-09-01 NOTE — CONSULTS
Infectious Disease Consultation Note        Reason: Open fracture right great toe, antibiotic recommendations    Current abx Prior abx   Ceftriaxone, vancomycin since 8/31/2022      Lines:       Assessment :   74 y.o. female with no significant past medical history presented emergency room on 8/31/2022 with right great toe pain status post injury. Clinical presentation consistent with open fracture to the right great toe first IP region and to the nail proximal nail plate interface, with nailbed and nail plate injury s/p nailbed repair, open reduction internal fixation of the right great toe distal phalanx on 8/31/22    High risk of infection of right great toe since patient was barefooted. Hence, will use empiric antibiotics. Recommendations:    Recommend ceftriaxone, vancomycin for now  Monitor right foot for cellulitis/wound infection  Will switch to po cefuroxime 500 mg po bid, doxycycline 100 mg po bid till 9/12/22 if no worsening erythema/drainage noted tomorrow. Risk of complicated infection despite oral antibiotics, need for surgery/amputation with deeper infection discussed with patient. She verbalized her understanding & was very anxious/in tears that her toenail may never grow back. Thank you for consultation request. Above plan was discussed in details with patient,  and dr Brina Núñez. Please call me if any further questions or concerns. Will continue to participate in the care of this patient. HPI:     74 y.o. female with no significant past medical history presented emergency room on 8/31/2022 with right great toe pain status post injury. Patient states that she was getting down the stairs quickly yesterday, she tripped as she missed a step, and hit the distal end of her right great toe on a concrete step. She had immediate significant pain and came to the emergency room for further evaluation. She was walking barefooted.   Diagnosed to have open fracture, to the right great toe distal phalanx, and with a significant laceration of the right great toe at the proximal portion of the nail plate skin interface. Evaluated by Ortho surgery. Status post nailbed repair, open reduction internal fixation of the right great toe distal phalanx on 8/31/22. I have been consulted for further recommendations    Patient denies any fever or chills throughout this time. She denies any prior history of MRSA infection or colonization. She states that she had toenail infection couple years ago at which time the toenail fell off spontaneously. She did not get any antibiotics at that time. She subsequently had regrowth of the toenail. She is very anxious about the current situation and was in tears stating that\" my toenail may not grow back, I will not be able to get pedicure, nail polish on my toe\". Past Medical History:   Diagnosis Date    Bipolar 2 disorder ([facility] [address]     Osteoarthritis        Past Surgical History:   Procedure Laterality Date    HX BREAST AUGMENTATION      HX HYSTERECTOMY         Home Medication List      Details   QUEtiapine (SEROquel) 25 mg tablet Take 25 mg by mouth three (3) times daily. divalproex ER (DEPAKOTE ER) 250 mg ER tablet TAKE 3 TABS BEDTIME FOR MOOD,BIPOLAR DISORD,CRNT EPISODE MIXED,SEVERE,W PSYCH FEATURES      lamoTRIgine (LaMICtal) 25 mg tablet       ALPRAZolam (XANAX) 1 mg tablet Take 1 mg by mouth two (2) times a day.       DULoxetine (CYMBALTA) 60 mg capsule TAKE 1 CAP ONCE A DAY FOR DEPRESSION             Current Facility-Administered Medications   Medication Dose Route Frequency    ondansetron (ZOFRAN) injection 4 mg  4 mg IntraVENous Q6H PRN    ALPRAZolam (XANAX) tablet 1 mg  1 mg Oral BID    lamoTRIgine (LaMICtal) tablet 25 mg  25 mg Oral DAILY    QUEtiapine (SEROquel) tablet 25 mg  25 mg Oral TID    divalproex ER (DEPAKOTE ER) 24 hour tablet 250 mg  250 mg Oral DAILY    sodium chloride (NS) flush 5-40 mL  5-40 mL IntraVENous Q8H    sodium chloride (NS) flush 5-40 mL  5-40 mL IntraVENous PRN    naloxone (NARCAN) injection 0.4 mg  0.4 mg IntraVENous PRN    oxyCODONE-acetaminophen (PERCOCET 7.5) 7.5-325 mg per tablet 1 Tablet  1 Tablet Oral Q4H PRN    oxyCODONE IR (ROXICODONE) tablet 10 mg  10 mg Oral Q4H PRN    HYDROmorphone (DILAUDID) injection 1 mg  1 mg IntraVENous Q4H PRN    cefTRIAXone (ROCEPHIN) 2 g in sterile water (preservative free) 20 mL IV syringe  2 g IntraVENous Q24H       Allergies: Patient has no known allergies. History reviewed. No pertinent family history. Social History     Socioeconomic History    Marital status:      Spouse name: Not on file    Number of children: Not on file    Years of education: Not on file    Highest education level: Not on file   Occupational History    Not on file   Tobacco Use    Smoking status: Never    Smokeless tobacco: Never   Substance and Sexual Activity    Alcohol use: Yes     Comment: rarely    Drug use: Never    Sexual activity: Not on file   Other Topics Concern    Not on file   Social History Narrative    Not on file     Social Determinants of Health     Financial Resource Strain: Not on file   Food Insecurity: Not on file   Transportation Needs: Not on file   Physical Activity: Not on file   Stress: Not on file   Social Connections: Not on file   Intimate Partner Violence: Not on file   Housing Stability: Not on file     Social History     Tobacco Use   Smoking Status Never   Smokeless Tobacco Never        Temp (24hrs), Av.8 °F (36.6 °C), Min:97.3 °F (36.3 °C), Max:98.3 °F (36.8 °C)    Visit Vitals  /70   Pulse 83   Temp 97.3 °F (36.3 °C)   Resp 20   Ht 5' 4\" (1.626 m)   Wt 70.3 kg (155 lb)   SpO2 100%   Breastfeeding No   BMI 26.61 kg/m²       ROS: 12 point ROS obtained in details. Pertinent positives as mentioned in HPI,   otherwise negative    Physical Exam:    General: Well developed, well nourished female laying on the bed/sitting on the  bed AAOx3 in no acute distress.     General:   awake alert and oriented   HEENT:  Normocephalic, atraumatic, PERRL, EOMI, no scleral icterus or pallor; no conjunctival hemmohage;  nasal and oral mucous are moist and without evidence of lesions. No thrush. Dentition good. Neck supple, no bruits. Lymph Nodes:   no cervical, axillary or inguinal adenopathy   Lungs:   non-labored, bilaterally clear to auscultation- no crackles wheezes rales or rhonchi   Heart:  RRR, s1 and s2; no murmurs rubs or gallops, no edema, + pedal pulses   Abdomen:  soft, non-distended, active bowel sounds, no hepatomegaly, no splenomegaly. Appropriate surgical scars for stated surgeries. Non-tender   Genitourinary:  deferred   Extremities:   no clubbing, cyanosis; no joint effusions or swelling; Full ROM of all large joints to the upper and lower extremities; muscle mass appropriate for age   Neurologic:  No gross focal sensory abnormalities; 5/5 muscle strength to upper and lower extremities. Speech appropriate. Cranial nerves intact                        Skin:  No rash or ulcers noted   Wound:       Back:  no spinal or paraspinal muscle tenderness or rigidity, no CVA tenderness     Psychiatric:  No suicidal or homicidal ideations, appropriate mood and affect         Labs: Results:   Chemistry Recent Labs     08/31/22  1207   *      K 4.9      CO2 29   BUN 11   CREA 0.90   CA 9.3   AGAP 2*   BUCR 12      TP 8.0   ALB 4.1   GLOB 3.9   AGRAT 1.1      CBC w/Diff Recent Labs     08/31/22  1207   WBC 7.5   RBC 4.80   HGB 14.7   HCT 44.9      GRANS 65   LYMPH 26   EOS 2      Microbiology No results for input(s): CULT in the last 72 hours. RADIOLOGY:    All available imaging studies/reports in [facility] for this admission were reviewed      Disclaimer: Sections of this note are dictated utilizing voice recognition software, which may have resulted in some phonetic based errors in grammar and contents.  Even though attempts were made to correct all the mistakes, some may have been missed, and remained in the body of the document. If questions arise, please contact our department.     Dr. Chuck Amin, Infectious Disease Specialist  [phone]  September 1, 2022  12:21 PM

## 2022-09-02 LAB
ANION GAP SERPL CALC-SCNC: 3 MMOL/L (ref 3–18)
BUN SERPL-MCNC: 12 MG/DL (ref 7–18)
BUN/CREAT SERPL: 19 (ref 12–20)
CALCIUM SERPL-MCNC: 8.4 MG/DL (ref 8.5–10.1)
CHLORIDE SERPL-SCNC: 114 MMOL/L (ref 100–111)
CO2 SERPL-SCNC: 25 MMOL/L (ref 21–32)
CREAT SERPL-MCNC: 0.64 MG/DL (ref 0.6–1.3)
GLUCOSE SERPL-MCNC: 91 MG/DL (ref 74–99)
POTASSIUM SERPL-SCNC: 4.5 MMOL/L (ref 3.5–5.5)
SODIUM SERPL-SCNC: 142 MMOL/L (ref 136–145)

## 2022-09-02 PROCEDURE — 2709999900 HC NON-CHARGEABLE SUPPLY

## 2022-09-02 PROCEDURE — 97535 SELF CARE MNGMENT TRAINING: CPT

## 2022-09-02 PROCEDURE — 74011250637 HC RX REV CODE- 250/637: Performed by: ORTHOPAEDIC SURGERY

## 2022-09-02 PROCEDURE — 97110 THERAPEUTIC EXERCISES: CPT

## 2022-09-02 PROCEDURE — 97116 GAIT TRAINING THERAPY: CPT

## 2022-09-02 PROCEDURE — 36415 COLL VENOUS BLD VENIPUNCTURE: CPT

## 2022-09-02 PROCEDURE — 65270000029 HC RM PRIVATE

## 2022-09-02 PROCEDURE — 80048 BASIC METABOLIC PNL TOTAL CA: CPT

## 2022-09-02 PROCEDURE — 99024 POSTOP FOLLOW-UP VISIT: CPT | Performed by: ORTHOPAEDIC SURGERY

## 2022-09-02 PROCEDURE — 74011000250 HC RX REV CODE- 250: Performed by: ORTHOPAEDIC SURGERY

## 2022-09-02 PROCEDURE — 74011250636 HC RX REV CODE- 250/636: Performed by: INTERNAL MEDICINE

## 2022-09-02 RX ORDER — DOXYCYCLINE 100 MG/1
100 TABLET ORAL 2 TIMES DAILY
Qty: 20 TABLET | Refills: 0 | Status: SHIPPED | OUTPATIENT
Start: 2022-09-02

## 2022-09-02 RX ORDER — OXYCODONE AND ACETAMINOPHEN 7.5; 325 MG/1; MG/1
1 TABLET ORAL
Qty: 40 TABLET | Refills: 0 | Status: SHIPPED | OUTPATIENT
Start: 2022-09-02 | End: 2022-09-09

## 2022-09-02 RX ORDER — DOXYCYCLINE 100 MG/1
100 CAPSULE ORAL EVERY 12 HOURS
Status: DISCONTINUED | OUTPATIENT
Start: 2022-09-03 | End: 2022-09-09 | Stop reason: HOSPADM

## 2022-09-02 RX ORDER — CEFUROXIME AXETIL 250 MG/1
500 TABLET ORAL EVERY 12 HOURS
Status: DISCONTINUED | OUTPATIENT
Start: 2022-09-03 | End: 2022-09-09 | Stop reason: HOSPADM

## 2022-09-02 RX ORDER — CEFUROXIME AXETIL 500 MG/1
500 TABLET ORAL 2 TIMES DAILY
Qty: 20 TABLET | Refills: 0 | Status: SHIPPED | OUTPATIENT
Start: 2022-09-02

## 2022-09-02 RX ADMIN — OXYCODONE HYDROCHLORIDE 10 MG: 10 TABLET ORAL at 05:43

## 2022-09-02 RX ADMIN — LAMOTRIGINE 25 MG: 25 TABLET ORAL at 09:35

## 2022-09-02 RX ADMIN — SODIUM CHLORIDE, PRESERVATIVE FREE 10 ML: 5 INJECTION INTRAVENOUS at 19:01

## 2022-09-02 RX ADMIN — QUETIAPINE FUMARATE 25 MG: 25 TABLET ORAL at 09:33

## 2022-09-02 RX ADMIN — OXYCODONE HYDROCHLORIDE 10 MG: 10 TABLET ORAL at 09:33

## 2022-09-02 RX ADMIN — ALPRAZOLAM 1 MG: 0.5 TABLET ORAL at 19:00

## 2022-09-02 RX ADMIN — OXYCODONE HYDROCHLORIDE 10 MG: 10 TABLET ORAL at 01:05

## 2022-09-02 RX ADMIN — VANCOMYCIN HYDROCHLORIDE 1250 MG: 10 INJECTION, POWDER, LYOPHILIZED, FOR SOLUTION INTRAVENOUS at 13:09

## 2022-09-02 RX ADMIN — OXYCODONE HYDROCHLORIDE 10 MG: 10 TABLET ORAL at 14:37

## 2022-09-02 RX ADMIN — OXYCODONE HYDROCHLORIDE 10 MG: 10 TABLET ORAL at 19:00

## 2022-09-02 RX ADMIN — SODIUM CHLORIDE, PRESERVATIVE FREE 10 ML: 5 INJECTION INTRAVENOUS at 05:43

## 2022-09-02 RX ADMIN — DIVALPROEX SODIUM 250 MG: 250 TABLET, FILM COATED, EXTENDED RELEASE ORAL at 09:35

## 2022-09-02 RX ADMIN — QUETIAPINE FUMARATE 25 MG: 25 TABLET ORAL at 19:00

## 2022-09-02 RX ADMIN — ALPRAZOLAM 1 MG: 0.5 TABLET ORAL at 09:35

## 2022-09-02 NOTE — ROUTINE PROCESS
Bedside and Verbal shift change report given to Haseeb Bansal RN (oncoming nurse) by Leah Mathews RN   (offgoing nurse). Report included the following information SBAR, Kardex, Intake/Output, MAR, and Recent Results.

## 2022-09-02 NOTE — PROGRESS NOTES
PROGRESS NOTE      Patient: Ana Palomino  MRN: [id number]  SSN: [id number]   YOB: 1955  Age: 74 y.o. Sex: female     Admit Date: 8/31/2022 LOS:  LOS: 2 days      POD # 1 S/P Procedure(s):  INCISION AND DRAINAGE LOWER EXTREMITY AND ORIF OF RIGHT GREAT TOE  Open repair of nailbed    ASSESSMENT/PLAN     Ana Palomino IS A 74 y.o. old who is resting at this time. She certainly does not fixate and perseverate her great toenail plate. From the very beginning I told her that you have to remove her nail plate because his open fracture and really fighting to save her toe, not only just her toenail plate. She is upset that she cannot get   pedicures at this current time. She lives alone, states she has difficulty going up and down steps. Plans to get her into skilled nursing is at all that she has anyone that can help her at home. She understands she will require staged procedure: Removal of the pin, and approxi-4 weeks duration. Orthopaedic:  A. Pain Meds : Narcotics: Dilaudid, Roxicodone, Percocet  B.  DVT Prophylaxis: SCD's,    C. IV AB: Antibiotics: vancomycin and ceftriaxone yes for open fracture right great toe . This will be switched to doxycycline 100 mg 1 p.o. twice daily, as well as cefuroxime 500 L 1 p.o. twice daily, each of these duration until September 12, 2022. D.  Weight Bear: Nonweightbearing right lower extremity with use of crutches  JOSIE Pulliam's following patient: Consultants following patients: Infectious disease consult      F.  PT/OT/ Intervention/ Consults:    PT/OT : [x]PT / [x] OT ordered //nonweightbearing right lower extremity    DC disposition: Home,       [x]  [x]  [x] [address] : I spoke with the , who can to see if we get this patient to a skilled nursing facility, ASAP.       OBJECTIVE EXAMINATION       Visit Vitals  /77   Pulse 76   Temp 97.9 °F (36.6 °C)   Resp 18   Ht 5' 4\" (1.626 m)   Wt 155 lb (70.3 kg)   SpO2 97%   Breastfeeding No   BMI 26.61 kg/m²       Appearance: Alert, well appearing and pleasant patient who is in no distress, oriented to person, place/time, and who follows commands. This patient is accompanied in the examination room by her  self. no dementia  Psychiatric: Affect and mood are appropriate. Respiratory: Breathing is unlabored without accessory chest muscle use  Peripheral Vascular: Normal Pulses to each hand and foot    EXTREMITY lower extremity    DRESSINGS: Mild soilage present  INCISIONS:  Incision looks good, No erythema  Wound: clean, tender, nailbed, is clean, slight slight dried blood along the dorsal or anterior nailbed. Pins in good position no redness around the pin  Extremities:        No embolic phenomena to the toes of the right forefoot              No significant edema to the right foot and or toes. Pulses in tact to the right foot             Lower extremities are warm and appear well perfused         DVT: No evidence of DVT seen on examination at this time            Moves left lower extremities well also has good movement to each bilateral ankles in terms of dorsiflexion plantarflexion.           Moves Upper extremities well    LABS AND MEDICATION REVIEW      CMP:   Lab Results   Component Value Date/Time     09/02/2022 04:19 AM    K 4.5 09/02/2022 04:19 AM     (H) 09/02/2022 04:19 AM    CO2 25 09/02/2022 04:19 AM    AGAP 3 09/02/2022 04:19 AM    GLU 91 09/02/2022 04:19 AM    BUN 12 09/02/2022 04:19 AM    CREA 0.64 09/02/2022 04:19 AM    GFRAA >60 09/02/2022 04:19 AM    GFRNA >60 09/02/2022 04:19 AM    CA 8.4 (L) 09/02/2022 04:19 AM     CBC:   No results found for: WBC, HGB, HGBEXT, HCT, HCTEXT, PLT, PLTEXT, HGBEXT, HCTEXT, PLTEXT    COAGS:   No results found for: APTT, PTP, INR, INREXT, INREXT       Current Facility-Administered Medications   Medication Dose Route Frequency    vancomycin (VANCOCIN) 1250 mg in  ml infusion  1,250 mg IntraVENous Q24H    ondansetron (ZOFRAN) injection 4 mg  4 mg IntraVENous Q6H PRN    ALPRAZolam (XANAX) tablet 1 mg  1 mg Oral BID    lamoTRIgine (LaMICtal) tablet 25 mg  25 mg Oral DAILY    QUEtiapine (SEROquel) tablet 25 mg  25 mg Oral TID    divalproex ER (DEPAKOTE ER) 24 hour tablet 250 mg  250 mg Oral DAILY    sodium chloride (NS) flush 5-40 mL  5-40 mL IntraVENous Q8H    sodium chloride (NS) flush 5-40 mL  5-40 mL IntraVENous PRN    naloxone (NARCAN) injection 0.4 mg  0.4 mg IntraVENous PRN    oxyCODONE-acetaminophen (PERCOCET 7.5) 7.5-325 mg per tablet 1 Tablet  1 Tablet Oral Q4H PRN    oxyCODONE IR (ROXICODONE) tablet 10 mg  10 mg Oral Q4H PRN    HYDROmorphone (DILAUDID) injection 1 mg  1 mg IntraVENous Q4H PRN    cefTRIAXone (ROCEPHIN) 2 g in sterile water (preservative free) 20 mL IV syringe  2 g IntraVENous Q24H          REVIEW OF SYSTEMS : 9/2/2022  ALL BELOW ARE Negative except : SEE HPI        Past Medical History:   Diagnosis Date    Bipolar 2 disorder ([facility] [address]     Osteoarthritis       Past Surgical History:   Procedure Laterality Date    HX BREAST AUGMENTATION      HX HYSTERECTOMY        Social History     Socioeconomic History    Marital status:      Spouse name: Not on file    Number of children: Not on file    Years of education: Not on file    Highest education level: Not on file   Occupational History    Not on file   Tobacco Use    Smoking status: Never    Smokeless tobacco: Never   Substance and Sexual Activity    Alcohol use: Yes     Comment: rarely    Drug use: Never    Sexual activity: Not on file   Other Topics Concern    Not on file   Social History Narrative    Not on file     Social Determinants of Health     Financial Resource Strain: Not on file   Food Insecurity: Not on file   Transportation Needs: Not on file   Physical Activity: Not on file   Stress: Not on file   Social Connections: Not on file   Intimate Partner Violence: Not on file Housing Stability: Not on file      History reviewed. No pertinent family history. Prior to Admission medications    Medication Sig Start Date End Date Taking? Authorizing Provider   cefUROXime (CEFTIN) 500 mg tablet Take 1 Tablet by mouth two (2) times a day. 9/2/22  Yes Bronwyn Pleitez MD   doxycycline (ADOXA) 100 mg tablet Take 1 Tablet by mouth two (2) times a day. 9/2/22  Yes Bronwyn Pleitez MD   oxyCODONE-acetaminophen (Percocet) 7.5-325 mg per tablet Take 1 Tablet by mouth every four (4) hours as needed for Pain for up to 7 days. Max Daily Amount: 6 Tablets. 9/2/22 9/9/22 Yes Bronwyn Pleitez MD   QUEtiapine (SEROquel) 25 mg tablet Take 25 mg by mouth three (3) times daily. 9/22/21  Yes Provider, Historical   divalproex ER (DEPAKOTE ER) 250 mg ER tablet TAKE 3 TABS BEDTIME FOR MOOD,BIPOLAR DISORD,CRNT EPISODE MIXED,SEVERE,W PSYCH FEATURES 9/20/21  Yes Provider, Historical   lamoTRIgine (LaMICtal) 25 mg tablet  7/28/21  Yes Provider, Historical   ALPRAZolam (XANAX) 1 mg tablet Take 1 mg by mouth two (2) times a day.   Patient not taking: Reported on 8/31/2022 9/22/21   Provider, Historical   DULoxetine (CYMBALTA) 60 mg capsule TAKE 1 CAP ONCE A DAY FOR DEPRESSION  Patient not taking: Reported on 8/31/2022 9/20/21   Provider, Historical     Current Facility-Administered Medications   Medication Dose Route Frequency    vancomycin (VANCOCIN) 1250 mg in  ml infusion  1,250 mg IntraVENous Q24H    ondansetron (ZOFRAN) injection 4 mg  4 mg IntraVENous Q6H PRN    ALPRAZolam (XANAX) tablet 1 mg  1 mg Oral BID    lamoTRIgine (LaMICtal) tablet 25 mg  25 mg Oral DAILY    QUEtiapine (SEROquel) tablet 25 mg  25 mg Oral TID    divalproex ER (DEPAKOTE ER) 24 hour tablet 250 mg  250 mg Oral DAILY    sodium chloride (NS) flush 5-40 mL  5-40 mL IntraVENous Q8H    sodium chloride (NS) flush 5-40 mL  5-40 mL IntraVENous PRN    naloxone (NARCAN) injection 0.4 mg  0.4 mg IntraVENous PRN    oxyCODONE-acetaminophen (PERCOCET 7.5) 7.5-325 mg per tablet 1 Tablet  1 Tablet Oral Q4H PRN    oxyCODONE IR (ROXICODONE) tablet 10 mg  10 mg Oral Q4H PRN    HYDROmorphone (DILAUDID) injection 1 mg  1 mg IntraVENous Q4H PRN    cefTRIAXone (ROCEPHIN) 2 g in sterile water (preservative free) 20 mL IV syringe  2 g IntraVENous Q24H     No Known Allergies        Conrad Leigh MD  9/2/2022  7:49 AM

## 2022-09-02 NOTE — REHAB NOTE
[facility] REFERRAL CONTACT NOTE  [id number] [place] [address] for Physical Rehabilitation      Thank you for the opportunity to review this patient's case for admission to [address] [facility]. Based on our pre-admission screening:     [ x] This patient does not meet criteria for admission to [facility] due to:    [ x] Documents do not reflect active medical necessity requiring close Physician involvement and Rehabilitation Nursing. Pt does not have a qualifying rehab diagnosis or medical necessity. Again, Thank you for this referral. Should you have any questions please do not hesitate to call.      Sincerely,  Alexis Irizarry Liaison  [facility]  [phone]

## 2022-09-02 NOTE — PROGRESS NOTES
Problem: Pain  Goal: *Control of Pain  Outcome: Progressing Towards Goal     Problem: Patient Education: Go to Patient Education Activity  Goal: Patient/Family Education  Outcome: Progressing Towards Goal     Problem: Falls - Risk of  Goal: *Absence of Falls  Description: Document Ivana Hammond Fall Risk and appropriate interventions in the flowsheet.   Outcome: Progressing Towards Goal  Note: Fall Risk Interventions:  Mobility Interventions: Bed/chair exit alarm, Patient to call before getting OOB, PT Consult for mobility concerns, Strengthening exercises (ROM-active/passive), Utilize walker, cane, or other assistive device         Medication Interventions: Bed/chair exit alarm, Evaluate medications/consider consulting pharmacy, Patient to call before getting OOB, Teach patient to arise slowly    Elimination Interventions: Bed/chair exit alarm, Call light in reach, Patient to call for help with toileting needs, Toilet paper/wipes in reach, Toileting schedule/hourly rounds    History of Falls Interventions: Bed/chair exit alarm, Evaluate medications/consider consulting pharmacy, Room close to nurse's station, Door open when patient unattended         Problem: Patient Education: Go to Patient Education Activity  Goal: Patient/Family Education  Outcome: Progressing Towards Goal

## 2022-09-02 NOTE — PROGRESS NOTES
Problem: Mobility Impaired (Adult and Pediatric)  Goal: *Acute Goals and Plan of Care (Insert Text)  Description: Physical Therapy Goals  Initiated 9/1/2022 and to be accomplished within 7 day(s)  1. Patient will move from supine to sit and sit to supine in bed with modified independence. 2.  Patient will transfer from bed to chair and chair to bed with modified independence using the least restrictive device. 3.  Patient will perform sit to stand with modified independence. 4.  Patient will ambulate with modified independence for 50 feet with the least restrictive device. 5.  Patient will ascend/descend 3 stairs with handrail(s) with minimal assistance/contact guard assist.    PLOF: Independent. Lives in boarding house on 2nd floor. 4 steps to enter; no handrails. Outcome: Progressing Towards Goal   PHYSICAL THERAPY TREATMENT    Patient: Cortney Smyth (89 y.o. female)  Date: 9/2/2022  Diagnosis: Open fracture of distal phalanx of right great toe [S92.421B] Open fracture of distal phalanx of right great toe  Procedure(s) (LRB):  INCISION AND DRAINAGE LOWER EXTREMITY AND ORIF OF RIGHT GREAT TOE (Right) 2 Days Post-Op  Precautions: Fall, NWB, Skin (NWB RLE)  PLOF: see above    ASSESSMENT:  Pt in bed tearful and distraught about the possibility of potentially losing her toe nail. Pt was eventually calmed and agreeable to participate in treatment session. Pt transferred to the edge of bed mod I.  Pt stood with CGA and verbal cues to maintain proper weightbearing as she continually tries to put her heal on the ground during transfers. Pt ambulated 14 feet with RW and CGA and then took a seated rest break and ambulated 24 feet with RW and CGA. Pt was too anxious to attempt stair training today as any time stairs were mentioned she began sobbing. Pt was left sitting up in the recliner with needs in reach and nursing notified.   Progression toward goals:   [x]      Improving appropriately and progressing toward goals  []      Improving slowly and progressing toward goals  []      Not making progress toward goals and plan of care will be adjusted     PLAN:  Patient continues to benefit from skilled intervention to address the above impairments. Continue treatment per established plan of care. Further Equipment Recommendations for Discharge:  rolling walker    AMPAC: Based on an AM-PAC score of 17/24 and their current functional mobility deficits, it is recommended that the patient have 5-7 sessions per week of Physical Therapy at d/c to increase the patient's independence. Currently, this patient demonstrates the potential endurance, and/or tolerance for 3 hours of therapy each day at d/c. This AMPAC score should be considered in conjunction with interdisciplinary team recommendations to determine the most appropriate discharge setting. Patient's social support, diagnosis, medical stability, and prior level of function should also be taken into consideration. SUBJECTIVE:   Patient stated I'm too worried about losing my toe nail to do anything. How will I ever be able to get a good man?     OBJECTIVE DATA SUMMARY:   Critical Behavior:  Anxious and tearful  Functional Mobility Training:  Bed Mobility:  Rolling: Modified independent  Supine to Sit: Modified independent  Transfers:  Sit to Stand: Contact guard assistance  Stand to Sit: Contact guard assistance  Bed to Chair: Contact guard assistance     Balance:  Sitting: Intact  Standing: With support  Standing - Static: Fair  Standing - Dynamic :  (fair-)       Ambulation/Gait Training:  Distance (ft): 24 Feet (ft) (14)  Assistive Device: Walker, rolling  Ambulation - Level of Assistance: Contact guard assistance  Right Side Weight Bearing: Non-weight bearing  Speed/Petra: Fluctuations  Therapeutic Exercises:         EXERCISE   Sets   Reps   Active Active Assist   Passive Self ROM   Comments   Ankle Pumps    [] [] [] []    Quad Sets/Glut Sets    [] [] [] [] Hold for 5 secs   Hamstring Sets   [] [] [] []    Short Arc Quads   [] [] [] []    Heel Slides 1 5 [x] [] [] []    Straight Leg Raises   [] [] [] []    Hip Abd/Add with knees extended 1 10 [x] [] [] []    Long Arc Quads 1 10 [x] [] [] []    Seated Marching 1 10 [x] [] [] []    Standing Marching   [] [] [] []       [] [] [] []        Pain:  Pain level pre-treatment: 8/10  Pain level post-treatment: 8/10   Pain Intervention(s): Rest, Repositioning   Response to intervention: Nurse notified, See doc flow    Activity Tolerance:   Fair-  Please refer to the flowsheet for vital signs taken during this treatment. After treatment:   [x] Patient left in no apparent distress sitting up in chair  [] Patient left in no apparent distress in bed  [x] Call bell left within reach  [x] Nursing notified  [] Caregiver present  [] Bed alarm activated  [] SCDs applied      COMMUNICATION/EDUCATION:   [x]         Role of Physical Therapy in the acute care setting. [x]         Fall prevention education was provided and the patient/caregiver indicated understanding. [x]         Patient/family have participated as able in working toward goals and plan of care. [x]         Patient/family agree to work toward stated goals and plan of care. []         Patient understands intent and goals of therapy, but is neutral about his/her participation.   []         Patient is unable to participate in stated goals/plan of care: ongoing with therapy staff.  []         Other:        Jenn Ibrahim, PT   Time Calculation: 23 mins    [facility] AM-PAC® Basic Mobility Inpatient Short Form (6-Clicks) Version 2    How much HELP from another person does the patient currently need    (If the patient hasn't done an activity recently, how much help from another person do you think he/she would need if he/she tried?)   Total (Total A or Dep)   A Lot  (Mod to Max A)   A Little (Sup or Min A)   None (Mod I to I)   Turning from your back to your side while in a flat bed without using bedrails? [] 1 [] 2 [] 3 [x] 4   2. Moving from lying on your back to sitting on the side of a flat bed without using bedrails? [] 1 [] 2 [] 3 [x] 4   3. Moving to and from a bed to a chair (including a wheelchair)? [] 1 [] 2 [x] 3 [] 4   4. Standing up from a chair using your arms (e.g., wheelchair, or bedside chair)? [] 1 [] 2 [x] 3 [] 4   5. Walking in hospital room? [] 1 [] 2 [x] 3 [] 4   6. Climbing 3-5 steps with a railing?+   [x] 1 [] 2 [] 3 [] 4   +If stair climbing cannot be assessed, skip item #6. Sum responses from items 1-5.

## 2022-09-02 NOTE — PROGRESS NOTES
conducted an initial consultation and Spiritual Assessment for Ángel Posey, who is a 74 y.o.,female. Patients Primary Language is: [place]. According to the patients EMR Sabianism Affiliation is: No Sikhism. The reason the Patient came to the hospital is:   Patient Active Problem List    Diagnosis Date Noted    Open fracture of distal phalanx of right great toe 08/31/2022        The  provided the following Interventions:  Initiated a relationship of care and support. Explored issues of jacek, belief, spirituality and Spiritism/ritual needs while hospitalized. Listened empathically. Provided information about Spiritual Care Services. Offered prayer and assurance of continued prayers on patient's behalf. Chart reviewed. The following outcomes where achieved:  Patient shared limited information about both their medical narrative and spiritual journey/beliefs. Patient processed feeling about current hospitalization. Patient expressed gratitude for 's visit. Assessment:  Patient does not have any Spiritism/cultural needs that will affect patients preferences in health care. There are no spiritual or Spiritism issues which require intervention at this time. Plan:  Chaplains will continue to follow and will provide pastoral care on an as needed/requested basis.  recommends bedside caregivers page  on duty if patient shows signs of acute spiritual or emotional distress.       [facility]   [phone]

## 2022-09-02 NOTE — PROGRESS NOTES
Problem: Self Care Deficits Care Plan (Adult)  Goal: *Acute Goals and Plan of Care (Insert Text)  Description: Occupational Therapy Goals  Initiated 9/1/2022 within 7 day(s). 1.  Patient will perform grooming with modified independence. 2.  Patient will perform bathing with modified independence. 3.  Patient will perform upper body dressing and lower body dressing with modified independence. 4.  Patient will perform toilet transfers with modified independence using RW while maintaining RLE NWB. 5.  Patient will perform all aspects of toileting with modified independence. 6.  Patient will participate in upper extremity therapeutic exercise/activities with modified independence for 8 minutes. 7.  Patient will utilize energy conservation techniques during functional activities with min verbal cues. Prior Level of Function: independent with ADLs and functional mobility w/o AD     Outcome: Progressing Towards Goal   OCCUPATIONAL THERAPY TREATMENT    Patient: Yomi Maddox (67 y.o. female)  Date: 9/2/2022  Diagnosis: Open fracture of distal phalanx of right great toe [S92.421B] Open fracture of distal phalanx of right great toe  Procedure(s) (LRB):  INCISION AND DRAINAGE LOWER EXTREMITY AND ORIF OF RIGHT GREAT TOE (Right) 2 Days Post-Op  Precautions: Fall, NWB, Skin (NWB RLE)    Chart, occupational therapy assessment, plan of care, and goals were reviewed. ASSESSMENT:  Pt attempting to use BSC upon entry. Pt w/poor compliance NWB RLE, requiring max vc's to maintain NWB RLE. Pt seen for ADL retraining. (See functional levels below) Educated on energy conservation techniques and benefits of shower chair. Educated on stand pivot transfer technique w/toileting ADL using BSC. Decrease dynamic standing balance requires assist w/clothing mgt and Min Assist to maintain balance. Pt c/o RLE pain 8/10 in dependent position at chair level. Pt returned to supine and elevated RLE.    Progression toward goals:  [x] Improving appropriately and progressing toward goals  []          Improving slowly and progressing toward goals  []          Not making progress toward goals and plan of care will be adjusted     PLAN:  Patient continues to benefit from skilled intervention to address the above impairments. Continue treatment per established plan of care. Further Equipment Recommendations for Discharge:  bedside commode, shower chair, rolling walker, and wheelchair     AMPAC: Based on an AM-PAC score of 17/24 and their current ADL deficits; it is recommended that the patient have 5-7 sessions per week of Occupational Therapy at d/c to increase the patient's independence. Currently, this patient demonstrates the potential endurance, and/or tolerance for 3 hours of therapy each day at d/c. This AMPAC score should be considered in conjunction with interdisciplinary team recommendations to determine the most appropriate discharge setting. Patient's social support, diagnosis, medical stability, and prior level of function should also be taken into consideration. SUBJECTIVE:   Patient stated Oh, I've been through alot.     OBJECTIVE DATA SUMMARY:   Cognitive/Behavioral Status:  Neurologic State: Alert  Orientation Level: Oriented X4  Cognition: Follows commands  Safety/Judgement: Fall prevention    Functional Mobility and Transfers for ADLs:   Bed Mobility:  Sit to Supine: Modified independent  Scooting: Modified independent     Transfers:  Sit to Stand: Supervision  Bed to Chair: Minimum assistance   Toilet Transfer : Minimum assistance     Balance:  Sitting: Intact  Standing: Impaired; With support  Standing - Static: Fair  Standing - Dynamic :  (fair-)    ADL Intervention:  Grooming  Position Performed: Seated in chair  Washing Face: Set-up  Washing Hands: Set-up  Brushing Teeth: Set-up  Applying Makeup: Set-up (applying deodorant)    Upper Body Bathing  Bathing Assistance: Set-up  Position Performed: Seated in chair    Lower Body Bathing  Perineal  : Minimum assistance  Position Performed: Standing    Upper [address]: Set-up    Toileting  Toileting Assistance: Moderate assistance  Bladder Hygiene: Set-up (seated)  Clothing Management: Maximum assistance    Pain:  Pain level pre-treatment: 8/10   Pain level post-treatment: 8/10  Pt c/o RLE pain. Provided elevation. Activity Tolerance:    Good    Please refer to the flowsheet for vital signs taken during this treatment. After treatment:   []  Patient left in no apparent distress sitting up in chair  [x]  Patient left in no apparent distress in bed  [x]  Call bell left within reach  []  Nursing notified  [x]  CSB advocate present  []  Bed alarm activated    COMMUNICATION/EDUCATION:   [] Role of Occupational Therapy in the acute care setting  [] Home safety education was provided and the patient/caregiver indicated understanding. [] Patient/family have participated as able in working towards goals and plan of care. [x] Patient/family agree to work toward stated goals and plan of care. [] Patient understands intent and goals of therapy, but is neutral about his/her participation. [] Patient is unable to participate in goal setting and plan of care. Thank you for this referral.  ALFIE Rivera  Time Calculation: 27 mins    [address] [facility] [address] [address] AM-PAC® Daily Activity Inpatient Short Form (6-Clicks)*    How much HELP from another person does the patient currently need    (If the patient hasn't done an activity recently, how much help from another person do you think he/she would need if he/she tried?)   Total (Total A or Dep)   A Lot  (Mod to Max A)   A Little (Sup or Min A)   None (Mod I to I)   Putting on and taking off regular lower body clothing? [] 1 [] 2 [x] 3 [] 4   2. Bathing (including washing, rinsing,      drying)? [] 1 [] 2 [x] 3 [] 4   3.  Toileting, which includes using toilet, bedpan or urinal?   [] 1 [x] 2 [] 3 [] 4   4. Putting on and taking off regular upper body clothing? [] 1 [] 2 [x] 3 [] 4   5. Taking care of personal grooming such as brushing teeth? [] 1 [] 2 [x] 3 [] 4   6. Eating meals?    [] 1 [] 2 [x] 3 [] 4

## 2022-09-02 NOTE — PROGRESS NOTES
Infectious Disease progress Note        Reason: Open fracture right great toe, antibiotic recommendations    Current abx Prior abx   Ceftriaxone, vancomycin since 8/31/2022      Lines:       Assessment :   74 y.o. female with no significant past medical history presented emergency room on 8/31/2022 with right great toe pain status post injury. Clinical presentation consistent with open fracture to the right great toe first IP region and to the nail proximal nail plate interface, with nailbed and nail plate injury s/p nailbed repair, open reduction internal fixation of the right great toe distal phalanx on 8/31/22    High risk of infection of right great toe since patient was barefooted. Hence, will use empiric antibiotics. Management complicated due to anxiety, lack of adequate social support at home    Recommendations:    D/c ceftriaxone, vancomycin for now  switch to po cefuroxime 500 mg po bid, doxycycline 100 mg po bid till 9/12/22   Mx of anxiety, d/c planning per primary team    Above plan was discussed in details with patient,  and dr Goyal Numbers. Please call me if any further questions or concerns. Will continue to participate in the care of this patient. HPI:    She is very anxious to go home. Wants to wait till tomorrow. \"I dont have any help at home. I am afraid I will fall\". was in tears stating that\" my toenail may not grow back, I will not be able to get pedicure, nail polish on my toe\". Past Medical History:   Diagnosis Date    Bipolar 2 disorder ([facility] [address]     Osteoarthritis        Past Surgical History:   Procedure Laterality Date    HX BREAST AUGMENTATION      HX HYSTERECTOMY         Home Medication List      Details   QUEtiapine (SEROquel) 25 mg tablet Take 25 mg by mouth three (3) times daily.       divalproex ER (DEPAKOTE ER) 250 mg ER tablet TAKE 3 TABS BEDTIME FOR MOOD,BIPOLAR DISORD,CRNT EPISODE MIXED,SEVERE,W PSYCH FEATURES      lamoTRIgine (LaMICtal) 25 mg tablet       ALPRAZolam Patria Romero 1 mg tablet Take 1 mg by mouth two (2) times a day. DULoxetine (CYMBALTA) 60 mg capsule TAKE 1 CAP ONCE A DAY FOR DEPRESSION             Current Facility-Administered Medications   Medication Dose Route Frequency    vancomycin (VANCOCIN) 1250 mg in  ml infusion  1,250 mg IntraVENous Q24H    ondansetron (ZOFRAN) injection 4 mg  4 mg IntraVENous Q6H PRN    ALPRAZolam (XANAX) tablet 1 mg  1 mg Oral BID    lamoTRIgine (LaMICtal) tablet 25 mg  25 mg Oral DAILY    QUEtiapine (SEROquel) tablet 25 mg  25 mg Oral TID    divalproex ER (DEPAKOTE ER) 24 hour tablet 250 mg  250 mg Oral DAILY    sodium chloride (NS) flush 5-40 mL  5-40 mL IntraVENous Q8H    sodium chloride (NS) flush 5-40 mL  5-40 mL IntraVENous PRN    naloxone (NARCAN) injection 0.4 mg  0.4 mg IntraVENous PRN    oxyCODONE-acetaminophen (PERCOCET 7.5) 7.5-325 mg per tablet 1 Tablet  1 Tablet Oral Q4H PRN    oxyCODONE IR (ROXICODONE) tablet 10 mg  10 mg Oral Q4H PRN    HYDROmorphone (DILAUDID) injection 1 mg  1 mg IntraVENous Q4H PRN    cefTRIAXone (ROCEPHIN) 2 g in sterile water (preservative free) 20 mL IV syringe  2 g IntraVENous Q24H       Allergies: Patient has no known allergies. History reviewed. No pertinent family history.   Social History     Socioeconomic History    Marital status:      Spouse name: Not on file    Number of children: Not on file    Years of education: Not on file    Highest education level: Not on file   Occupational History    Not on file   Tobacco Use    Smoking status: Never    Smokeless tobacco: Never   Substance and Sexual Activity    Alcohol use: Yes     Comment: rarely    Drug use: Never    Sexual activity: Not on file   Other Topics Concern    Not on file   Social History Narrative    Not on file     Social Determinants of Health     Financial Resource Strain: Not on file   Food Insecurity: Not on file   Transportation Needs: Not on file   Physical Activity: Not on file   Stress: Not on file   Social Connections: Not on file   Intimate Partner Violence: Not on file   Housing Stability: Not on file     Social History     Tobacco Use   Smoking Status Never   Smokeless Tobacco Never        Temp (24hrs), Av °F (36.7 °C), Min:97.2 °F (36.2 °C), Max:98.8 °F (37.1 °C)    Visit Vitals  /78   Pulse 86   Temp 98.8 °F (37.1 °C)   Resp 19   Ht 5' 4\" (1.626 m)   Wt 70.3 kg (155 lb)   SpO2 97%   Breastfeeding No   BMI 26.61 kg/m²       ROS: 12 point ROS obtained in details. Pertinent positives as mentioned in HPI,   otherwise negative    Physical Exam:    General: Well developed, well nourished female sitting on the  bed AAOx3 in no acute distress. General:   awake alert and oriented   HEENT:  Normocephalic, atraumatic, , EOMI, no scleral icterus or pallor; no conjunctival hemmohage;  nasal and oral mucous are moist and without evidence of lesions. . Neck supple, no bruits. Lymph Nodes:   Not examined   Lungs:   non-labored, bilateral chest movements equal, no audible wheezing   Heart:  RRR, s1 and s2;  no edema   Abdomen:  soft, non-distended, active bowel sounds, no hepatomegaly, no splenomegaly. . Non-tender   Genitourinary:  deferred   Extremities:   no clubbing, cyanosis; no joint effusions or swelling; Full ROM of all large joints to the upper and lower extremities; muscle mass appropriate for age; darkish erythema right great toe with pin in place-no increased warmth/erythema/tenderness right leg   Neurologic:  No gross focal sensory or motor abnormalities; . Cranial nerves intact                        Skin:  No rash or ulcers noted   Psychiatric:  anxious         Labs: Results:   Chemistry Recent Labs     22  0419 22  1354 22  1207   GLU 91 100* 104*    140 141   K 4.5 4.5 4.9   * 107 110   CO2 25 29 29   BUN 12 16 11   CREA 0.64 0.93 0.90   CA 8.4* 8.5 9.3   AGAP 3 4 2*   BUCR 19 17 12   AP  --   --  105   TP  --   --  8.0   ALB  --   --  4.1   GLOB  --   --  3.9   AGRAT --   --  1.1        CBC w/Diff Recent Labs     08/31/22  1207   WBC 7.5   RBC 4.80   HGB 14.7   HCT 44.9      GRANS 65   LYMPH 26   EOS 2        Microbiology No results for input(s): CULT in the last 72 hours. RADIOLOGY:    All available imaging studies/reports in [facility] for this admission were reviewed      Disclaimer: Sections of this note are dictated utilizing voice recognition software, which may have resulted in some phonetic based errors in grammar and contents. Even though attempts were made to correct all the mistakes, some may have been missed, and remained in the body of the document. If questions arise, please contact our department.     Dr. Sisi Rowley, Infectious Disease Specialist  [phone]  September 2, 2022  12:21 PM

## 2022-09-02 NOTE — PROGRESS NOTES
Lorraine Comer with [address] came to see patient, asking for recommendations for Home health and DME like a RW or Rollator for patient. PT and OT evaluations are recommending ARU for patient. CM called Alexys Baker in ARU, received voicemail, CM left message asking if they can review patient for possible admission.              Mary Ellen Martínez, RN  Case Management [phone]

## 2022-09-03 PROCEDURE — 74011250637 HC RX REV CODE- 250/637: Performed by: ORTHOPAEDIC SURGERY

## 2022-09-03 PROCEDURE — 74011000250 HC RX REV CODE- 250: Performed by: ORTHOPAEDIC SURGERY

## 2022-09-03 PROCEDURE — 2709999900 HC NON-CHARGEABLE SUPPLY

## 2022-09-03 PROCEDURE — 74011000250 HC RX REV CODE- 250: Performed by: INTERNAL MEDICINE

## 2022-09-03 PROCEDURE — 74011250637 HC RX REV CODE- 250/637: Performed by: INTERNAL MEDICINE

## 2022-09-03 PROCEDURE — 74011250636 HC RX REV CODE- 250/636: Performed by: INTERNAL MEDICINE

## 2022-09-03 PROCEDURE — 65270000029 HC RM PRIVATE

## 2022-09-03 RX ADMIN — ALPRAZOLAM 1 MG: 0.5 TABLET ORAL at 17:08

## 2022-09-03 RX ADMIN — DOXYCYCLINE HYCLATE 100 MG: 100 CAPSULE ORAL at 22:14

## 2022-09-03 RX ADMIN — DOXYCYCLINE HYCLATE 100 MG: 100 CAPSULE ORAL at 08:27

## 2022-09-03 RX ADMIN — ALPRAZOLAM 1 MG: 0.5 TABLET ORAL at 08:26

## 2022-09-03 RX ADMIN — QUETIAPINE FUMARATE 25 MG: 25 TABLET ORAL at 00:01

## 2022-09-03 RX ADMIN — DIVALPROEX SODIUM 250 MG: 250 TABLET, FILM COATED, EXTENDED RELEASE ORAL at 08:26

## 2022-09-03 RX ADMIN — LAMOTRIGINE 25 MG: 25 TABLET ORAL at 08:28

## 2022-09-03 RX ADMIN — SODIUM CHLORIDE, PRESERVATIVE FREE 10 ML: 5 INJECTION INTRAVENOUS at 22:27

## 2022-09-03 RX ADMIN — OXYCODONE HYDROCHLORIDE 10 MG: 10 TABLET ORAL at 17:08

## 2022-09-03 RX ADMIN — OXYCODONE HYDROCHLORIDE AND ACETAMINOPHEN 1 TABLET: 7.5; 325 TABLET ORAL at 22:16

## 2022-09-03 RX ADMIN — CEFUROXIME AXETIL 500 MG: 250 TABLET, FILM COATED ORAL at 22:14

## 2022-09-03 RX ADMIN — OXYCODONE HYDROCHLORIDE 10 MG: 10 TABLET ORAL at 08:27

## 2022-09-03 RX ADMIN — QUETIAPINE FUMARATE 25 MG: 25 TABLET ORAL at 08:26

## 2022-09-03 RX ADMIN — OXYCODONE HYDROCHLORIDE 10 MG: 10 TABLET ORAL at 12:59

## 2022-09-03 RX ADMIN — OXYCODONE HYDROCHLORIDE 10 MG: 10 TABLET ORAL at 00:01

## 2022-09-03 RX ADMIN — CEFUROXIME AXETIL 500 MG: 250 TABLET, FILM COATED ORAL at 08:28

## 2022-09-03 RX ADMIN — WATER 2 G: 1 INJECTION INTRAMUSCULAR; INTRAVENOUS; SUBCUTANEOUS at 00:02

## 2022-09-03 RX ADMIN — QUETIAPINE FUMARATE 25 MG: 25 TABLET ORAL at 17:08

## 2022-09-03 RX ADMIN — SODIUM CHLORIDE, PRESERVATIVE FREE 10 ML: 5 INJECTION INTRAVENOUS at 00:02

## 2022-09-03 RX ADMIN — QUETIAPINE FUMARATE 25 MG: 25 TABLET ORAL at 22:14

## 2022-09-03 NOTE — PROGRESS NOTES
Bedside and Verbal shift change report given to Loving RN  (oncoming nurse) by Ang Quinones (offgoing nurse). Report included the following information SBAR, Kardex, and Recent Results.

## 2022-09-03 NOTE — ROUTINE PROCESS
Bedside and Verbal shift change report given to Miroslava Rivera RN (oncoming nurse) by Lindsay Ramos RN (offgoing nurse). Report included the following information SBAR, Kardex, and Recent Results.

## 2022-09-03 NOTE — PROGRESS NOTES
PROGRESS NOTE      Patient: Bandar Barrera  MRN: [id number]  SSN: [id number]   YOB: 1955  Age: 74 y.o. Sex: female     Admit Date: 8/31/2022 LOS:  LOS: 3 days      POD # 1 S/P Procedure(s):  INCISION AND DRAINAGE LOWER EXTREMITY AND ORIF OF RIGHT GREAT TOE  Open repair of nailbed    ASSESSMENT/PLAN     Bandar Barrera IS A 74 y.o. old who is resting at this time. She certainly does not fixate and perseverate her great toenail plate. From the very beginning I told her that you have to remove her nail plate because his open fracture and really fighting to save her toe, not only just her toenail plate. She is upset that she cannot get   pedicures at this current time. She lives alone, states she has difficulty going up and down steps. Plans to get her into skilled nursing is at all that she has anyone that can help her at home. She understands she will require staged procedure: Removal of the pin, and approxi-4 weeks duration. Orthopaedic:  A. Pain Meds : Narcotics: Dilaudid, Roxicodone, Percocet  B.  DVT Prophylaxis: SCD's,    C. IV AB: Antibiotics: vancomycin and ceftriaxone yes for open fracture right great toe . This will be switched to doxycycline 100 mg 1 p.o. twice daily, as well as cefuroxime 500 L 1 p.o. twice daily, each of these duration until September 12, 2022. D.  Weight Bear: Nonweightbearing right lower extremity with use of crutches  JOSIE Pulliam's following patient: Consultants following patients: Infectious disease consult      F.  PT/OT/ Intervention/ Consults:    PT/OT : [x]PT / [x] OT ordered //nonweightbearing right lower extremity    DC disposition: Home,       [x]  [x]  [x] [facility] : Recommend skilled nursing facility placement. Do not feel that it is safe for patient to be discharged to home as she has multiple stairs and cannot be compliant with nonweightbearing status.   She did not qualify for acute rehab. Message sent to case management today      OBJECTIVE EXAMINATION       Visit Vitals  /80   Pulse 85   Temp 97.7 °F (36.5 °C)   Resp 17   Ht 5' 4\" (1.626 m)   Wt 155 lb (70.3 kg)   SpO2 96%   Breastfeeding No   BMI 26.61 kg/m²       Appearance: Alert, well appearing and pleasant patient who is in no distress, oriented to person, place/time, and who follows commands. This patient is accompanied in the examination room by her  self. no dementia  Psychiatric: Affect and mood are appropriate. Respiratory: Breathing is unlabored without accessory chest muscle use  Peripheral Vascular: Normal Pulses to each hand and foot    EXTREMITY lower extremity    DRESSINGS: Mild soilage present  INCISIONS:  Incision looks good, No erythema  Wound: clean, tender, nailbed, is clean, slight slight dried blood along the dorsal or anterior nailbed. Pins in good position no redness around the pin  Extremities:        No embolic phenomena to the toes of the right forefoot              No significant edema to the right foot and or toes. Pulses in tact to the right foot             Lower extremities are warm and appear well perfused         DVT: No evidence of DVT seen on examination at this time            Moves left lower extremities well also has good movement to each bilateral ankles in terms of dorsiflexion plantarflexion.           Moves Upper extremities well    LABS AND MEDICATION REVIEW      CMP:   No results found for: NA, K, CL, CO2, AGAP, GLU, BUN, CREA, GFRAA, GFRNA, CA, MG, PHOS, ALB, TBIL, TP, ALB, GLOB, AGRAT, ALT    CBC:   No results found for: WBC, HGB, HGBEXT, HCT, HCTEXT, PLT, PLTEXT, HGBEXT, HCTEXT, PLTEXT    COAGS:   No results found for: APTT, PTP, INR, INREXT, INREXT       Current Facility-Administered Medications   Medication Dose Route Frequency    cefUROXime (CEFTIN) tablet 500 mg  500 mg Oral Q12H    doxycycline (VIBRAMYCIN) capsule 100 mg  100 mg Oral Q12H ondansetron (ZOFRAN) injection 4 mg  4 mg IntraVENous Q6H PRN    ALPRAZolam (XANAX) tablet 1 mg  1 mg Oral BID    lamoTRIgine (LaMICtal) tablet 25 mg  25 mg Oral DAILY    QUEtiapine (SEROquel) tablet 25 mg  25 mg Oral TID    divalproex ER (DEPAKOTE ER) 24 hour tablet 250 mg  250 mg Oral DAILY    sodium chloride (NS) flush 5-40 mL  5-40 mL IntraVENous Q8H    sodium chloride (NS) flush 5-40 mL  5-40 mL IntraVENous PRN    naloxone (NARCAN) injection 0.4 mg  0.4 mg IntraVENous PRN    oxyCODONE-acetaminophen (PERCOCET 7.5) 7.5-325 mg per tablet 1 Tablet  1 Tablet Oral Q4H PRN    oxyCODONE IR (ROXICODONE) tablet 10 mg  10 mg Oral Q4H PRN    HYDROmorphone (DILAUDID) injection 1 mg  1 mg IntraVENous Q4H PRN          REVIEW OF SYSTEMS : 9/3/2022  ALL BELOW ARE Negative except : SEE HPI        Past Medical History:   Diagnosis Date    Bipolar 2 disorder ([facility] [address]     Osteoarthritis       Past Surgical History:   Procedure Laterality Date    HX BREAST AUGMENTATION      HX HYSTERECTOMY        Social History     Socioeconomic History    Marital status:      Spouse name: Not on file    Number of children: Not on file    Years of education: Not on file    Highest education level: Not on file   Occupational History    Not on file   Tobacco Use    Smoking status: Never    Smokeless tobacco: Never   Substance and Sexual Activity    Alcohol use: Yes     Comment: rarely    Drug use: Never    Sexual activity: Not on file   Other Topics Concern    Not on file   Social History Narrative    Not on file     Social Determinants of Health     Financial Resource Strain: Not on file   Food Insecurity: Not on file   Transportation Needs: Not on file   Physical Activity: Not on file   Stress: Not on file   Social Connections: Not on file   Intimate Partner Violence: Not on file   Housing Stability: Not on file      History reviewed. No pertinent family history. Prior to Admission medications    Medication Sig Start Date End Date Taking? Authorizing Provider   cefUROXime (CEFTIN) 500 mg tablet Take 1 Tablet by mouth two (2) times a day. 9/2/22  Yes Daniel Pleitez MD   doxycycline (ADOXA) 100 mg tablet Take 1 Tablet by mouth two (2) times a day. 9/2/22  Yes Daniel Pleitez MD   oxyCODONE-acetaminophen (Percocet) 7.5-325 mg per tablet Take 1 Tablet by mouth every four (4) hours as needed for Pain for up to 7 days. Max Daily Amount: 6 Tablets. 9/2/22 9/9/22 Yes Daniel Pleitez MD   QUEtiapine (SEROquel) 25 mg tablet Take 25 mg by mouth three (3) times daily. 9/22/21  Yes Provider, Historical   divalproex ER (DEPAKOTE ER) 250 mg ER tablet TAKE 3 TABS BEDTIME FOR MOOD,BIPOLAR DISORD,CRNT EPISODE MIXED,SEVERE,W PSYCH FEATURES 9/20/21  Yes Provider, Historical   lamoTRIgine (LaMICtal) 25 mg tablet  7/28/21  Yes Provider, Historical   ALPRAZolam (XANAX) 1 mg tablet Take 1 mg by mouth two (2) times a day.   Patient not taking: Reported on 8/31/2022 9/22/21   Provider, Historical   DULoxetine (CYMBALTA) 60 mg capsule TAKE 1 CAP ONCE A DAY FOR DEPRESSION  Patient not taking: Reported on 8/31/2022 9/20/21   Provider, Historical     Current Facility-Administered Medications   Medication Dose Route Frequency    cefUROXime (CEFTIN) tablet 500 mg  500 mg Oral Q12H    doxycycline (VIBRAMYCIN) capsule 100 mg  100 mg Oral Q12H    ondansetron (ZOFRAN) injection 4 mg  4 mg IntraVENous Q6H PRN    ALPRAZolam (XANAX) tablet 1 mg  1 mg Oral BID    lamoTRIgine (LaMICtal) tablet 25 mg  25 mg Oral DAILY    QUEtiapine (SEROquel) tablet 25 mg  25 mg Oral TID    divalproex ER (DEPAKOTE ER) 24 hour tablet 250 mg  250 mg Oral DAILY    sodium chloride (NS) flush 5-40 mL  5-40 mL IntraVENous Q8H    sodium chloride (NS) flush 5-40 mL  5-40 mL IntraVENous PRN    naloxone (NARCAN) injection 0.4 mg  0.4 mg IntraVENous PRN    oxyCODONE-acetaminophen (PERCOCET 7.5) 7.5-325 mg per tablet 1 Tablet  1 Tablet Oral Q4H PRN    oxyCODONE IR (ROXICODONE) tablet 10 mg  10 mg Oral Q4H PRN HYDROmorphone (DILAUDID) injection 1 mg  1 mg IntraVENous Q4H PRN     No Known Allergies        Navdeep Amaya  9/3/2022  7:49 AM

## 2022-09-04 LAB — GLUCOSE BLD STRIP.AUTO-MCNC: 103 MG/DL (ref 70–110)

## 2022-09-04 PROCEDURE — 74011250637 HC RX REV CODE- 250/637: Performed by: INTERNAL MEDICINE

## 2022-09-04 PROCEDURE — 74011250637 HC RX REV CODE- 250/637: Performed by: ORTHOPAEDIC SURGERY

## 2022-09-04 PROCEDURE — 2709999900 HC NON-CHARGEABLE SUPPLY

## 2022-09-04 PROCEDURE — 82962 GLUCOSE BLOOD TEST: CPT

## 2022-09-04 PROCEDURE — 65270000029 HC RM PRIVATE

## 2022-09-04 PROCEDURE — 74011000250 HC RX REV CODE- 250: Performed by: ORTHOPAEDIC SURGERY

## 2022-09-04 RX ADMIN — ALPRAZOLAM 1 MG: 0.5 TABLET ORAL at 23:15

## 2022-09-04 RX ADMIN — LAMOTRIGINE 25 MG: 25 TABLET ORAL at 09:37

## 2022-09-04 RX ADMIN — OXYCODONE HYDROCHLORIDE 10 MG: 10 TABLET ORAL at 04:07

## 2022-09-04 RX ADMIN — OXYCODONE HYDROCHLORIDE 10 MG: 10 TABLET ORAL at 17:37

## 2022-09-04 RX ADMIN — DIVALPROEX SODIUM 250 MG: 250 TABLET, FILM COATED, EXTENDED RELEASE ORAL at 09:37

## 2022-09-04 RX ADMIN — QUETIAPINE FUMARATE 25 MG: 25 TABLET ORAL at 09:37

## 2022-09-04 RX ADMIN — CEFUROXIME AXETIL 500 MG: 250 TABLET, FILM COATED ORAL at 09:37

## 2022-09-04 RX ADMIN — QUETIAPINE FUMARATE 25 MG: 25 TABLET ORAL at 17:37

## 2022-09-04 RX ADMIN — DOXYCYCLINE HYCLATE 100 MG: 100 CAPSULE ORAL at 09:37

## 2022-09-04 RX ADMIN — OXYCODONE HYDROCHLORIDE 10 MG: 10 TABLET ORAL at 09:35

## 2022-09-04 RX ADMIN — ALPRAZOLAM 1 MG: 0.5 TABLET ORAL at 09:37

## 2022-09-04 RX ADMIN — DOXYCYCLINE HYCLATE 100 MG: 100 CAPSULE ORAL at 23:16

## 2022-09-04 RX ADMIN — SODIUM CHLORIDE, PRESERVATIVE FREE 10 ML: 5 INJECTION INTRAVENOUS at 06:00

## 2022-09-04 RX ADMIN — CEFUROXIME AXETIL 500 MG: 250 TABLET, FILM COATED ORAL at 23:15

## 2022-09-04 RX ADMIN — SODIUM CHLORIDE, PRESERVATIVE FREE 10 ML: 5 INJECTION INTRAVENOUS at 23:17

## 2022-09-04 RX ADMIN — QUETIAPINE FUMARATE 25 MG: 25 TABLET ORAL at 23:16

## 2022-09-04 NOTE — PROGRESS NOTES
PROGRESS NOTE      Patient: Cortney Smyth  MRN: [id number]  SSN: [id number]   YOB: 1955  Age: 74 y.o. Sex: female     Admit Date: 8/31/2022 LOS:  LOS: 4 days      POD # 1 S/P Procedure(s):  INCISION AND DRAINAGE LOWER EXTREMITY AND ORIF OF RIGHT GREAT TOE  Open repair of nailbed    ASSESSMENT/PLAN     Cortney Smyth IS A 74 y.o. old who is resting at this time. Pain is controlled. She lives alone, states she has difficulty going up and down steps. Plans to get her into skilled nursing secondary to no help at home    She understands she will require staged procedure: Removal of the pin, and approxi-4 weeks duration. Orthopaedic:  A. Pain Meds : Narcotics: Dilaudid, Roxicodone, Percocet  B.  DVT Prophylaxis: SCD's,    C. IV AB: Antibiotics: vancomycin and ceftriaxone yes for open fracture right great toe . This will be switched to doxycycline 100 mg 1 p.o. twice daily, as well as cefuroxime 500 L 1 p.o. twice daily, each of these duration until September 12, 2022. D.  Weight Bear: Nonweightbearing right lower extremity with use of crutches  EPrincess Pulliam's following patient: Consultants following patients: Infectious disease consult      F.  PT/OT/ Intervention/ Consults:    PT/OT : [x]PT / [x] OT ordered //nonweightbearing right lower extremity    DC disposition: Home,       [x]  [x]  [x] [address] [facility] [address] [address] [facility] [address] : Recommend skilled nursing facility placement. Do not feel that it is safe for patient to be discharged to home as she has multiple stairs and cannot be compliant with nonweightbearing status. She did not qualify for acute rehab.   Message sent to case management today      OBJECTIVE EXAMINATION       Visit Vitals  BP (!) 154/82   Pulse 76   Temp 97.6 °F (36.4 °C)   Resp 18   Ht 5' 4\" (1.626 m)   Wt 155 lb (70.3 kg)   SpO2 95%   Breastfeeding No   BMI 26.61 kg/m²       Appearance: Alert, well appearing and pleasant patient who is in no distress, oriented to person, place/time, and who follows commands. This patient is accompanied in the examination room by her  self. no dementia  Psychiatric: Affect and mood are appropriate. Respiratory: Breathing is unlabored without accessory chest muscle use  Peripheral Vascular: Normal Pulses to each hand and foot    EXTREMITY lower extremity    DRESSINGS: Mild soilage present  INCISIONS:  Incision looks good, No erythema  Wound: clean, tender, nailbed, is clean, slight slight dried blood along the dorsal or anterior nailbed. Pins in good position no redness around the pin  Extremities:        No embolic phenomena to the toes of the right forefoot              No significant edema to the right foot and or toes. Pulses in tact to the right foot             Lower extremities are warm and appear well perfused         DVT: No evidence of DVT seen on examination at this time            Moves left lower extremities well also has good movement to each bilateral ankles in terms of dorsiflexion plantarflexion.           Moves Upper extremities well    LABS AND MEDICATION REVIEW      CMP:   No results found for: NA, K, CL, CO2, AGAP, GLU, BUN, CREA, GFRAA, GFRNA, CA, MG, PHOS, ALB, TBIL, TP, ALB, GLOB, AGRAT, ALT    CBC:   No results found for: WBC, HGB, HGBEXT, HCT, HCTEXT, PLT, PLTEXT, HGBEXT, HCTEXT, PLTEXT    COAGS:   No results found for: APTT, PTP, INR, INREXT, INREXT       Current Facility-Administered Medications   Medication Dose Route Frequency    cefUROXime (CEFTIN) tablet 500 mg  500 mg Oral Q12H    doxycycline (VIBRAMYCIN) capsule 100 mg  100 mg Oral Q12H    ondansetron (ZOFRAN) injection 4 mg  4 mg IntraVENous Q6H PRN    ALPRAZolam (XANAX) tablet 1 mg  1 mg Oral BID    lamoTRIgine (LaMICtal) tablet 25 mg  25 mg Oral DAILY    QUEtiapine (SEROquel) tablet 25 mg  25 mg Oral TID    divalproex ER (DEPAKOTE ER) 24 hour tablet 250 mg  250 mg Oral DAILY    sodium chloride (NS) flush 5-40 mL  5-40 mL IntraVENous Q8H    sodium chloride (NS) flush 5-40 mL  5-40 mL IntraVENous PRN    naloxone (NARCAN) injection 0.4 mg  0.4 mg IntraVENous PRN    oxyCODONE-acetaminophen (PERCOCET 7.5) 7.5-325 mg per tablet 1 Tablet  1 Tablet Oral Q4H PRN    oxyCODONE IR (ROXICODONE) tablet 10 mg  10 mg Oral Q4H PRN    HYDROmorphone (DILAUDID) injection 1 mg  1 mg IntraVENous Q4H PRN          REVIEW OF SYSTEMS : 9/4/2022  ALL BELOW ARE Negative except : SEE HPI        Past Medical History:   Diagnosis Date    Bipolar 2 disorder ([facility] [address]     Osteoarthritis       Past Surgical History:   Procedure Laterality Date    HX BREAST AUGMENTATION      HX HYSTERECTOMY        Social History     Socioeconomic History    Marital status:      Spouse name: Not on file    Number of children: Not on file    Years of education: Not on file    Highest education level: Not on file   Occupational History    Not on file   Tobacco Use    Smoking status: Never    Smokeless tobacco: Never   Substance and Sexual Activity    Alcohol use: Yes     Comment: rarely    Drug use: Never    Sexual activity: Not on file   Other Topics Concern    Not on file   Social History Narrative    Not on file     Social Determinants of Health     Financial Resource Strain: Not on file   Food Insecurity: Not on file   Transportation Needs: Not on file   Physical Activity: Not on file   Stress: Not on file   Social Connections: Not on file   Intimate Partner Violence: Not on file   Housing Stability: Not on file      History reviewed. No pertinent family history. Prior to Admission medications    Medication Sig Start Date End Date Taking? Authorizing Provider   cefUROXime (CEFTIN) 500 mg tablet Take 1 Tablet by mouth two (2) times a day. 9/2/22  Yes Marisa Pleitez MD   doxycycline (ADOXA) 100 mg tablet Take 1 Tablet by mouth two (2) times a day.  9/2/22  Yes Marisa Pleitez MD   oxyCODONE-acetaminophen (Percocet) 7.5-325 mg per tablet Take 1 Tablet by mouth every four (4) hours as needed for Pain for up to 7 days. Max Daily Amount: 6 Tablets. 9/2/22 9/9/22 Yes Kingston Pleitez MD   QUEtiapine (SEROquel) 25 mg tablet Take 25 mg by mouth three (3) times daily. 9/22/21  Yes Provider, Historical   divalproex ER (DEPAKOTE ER) 250 mg ER tablet TAKE 3 TABS BEDTIME FOR MOOD,BIPOLAR DISORD,CRNT EPISODE MIXED,SEVERE,W PSYCH FEATURES 9/20/21  Yes Provider, Historical   lamoTRIgine (LaMICtal) 25 mg tablet  7/28/21  Yes Provider, Historical   ALPRAZolam (XANAX) 1 mg tablet Take 1 mg by mouth two (2) times a day.   Patient not taking: Reported on 8/31/2022 9/22/21   Provider, Historical   DULoxetine (CYMBALTA) 60 mg capsule TAKE 1 CAP ONCE A DAY FOR DEPRESSION  Patient not taking: Reported on 8/31/2022 9/20/21   Provider, Historical     Current Facility-Administered Medications   Medication Dose Route Frequency    cefUROXime (CEFTIN) tablet 500 mg  500 mg Oral Q12H    doxycycline (VIBRAMYCIN) capsule 100 mg  100 mg Oral Q12H    ondansetron (ZOFRAN) injection 4 mg  4 mg IntraVENous Q6H PRN    ALPRAZolam (XANAX) tablet 1 mg  1 mg Oral BID    lamoTRIgine (LaMICtal) tablet 25 mg  25 mg Oral DAILY    QUEtiapine (SEROquel) tablet 25 mg  25 mg Oral TID    divalproex ER (DEPAKOTE ER) 24 hour tablet 250 mg  250 mg Oral DAILY    sodium chloride (NS) flush 5-40 mL  5-40 mL IntraVENous Q8H    sodium chloride (NS) flush 5-40 mL  5-40 mL IntraVENous PRN    naloxone (NARCAN) injection 0.4 mg  0.4 mg IntraVENous PRN    oxyCODONE-acetaminophen (PERCOCET 7.5) 7.5-325 mg per tablet 1 Tablet  1 Tablet Oral Q4H PRN    oxyCODONE IR (ROXICODONE) tablet 10 mg  10 mg Oral Q4H PRN    HYDROmorphone (DILAUDID) injection 1 mg  1 mg IntraVENous Q4H PRN     No Known Allergies        MEKA Stoner  9/4/2022  7:49 AM

## 2022-09-04 NOTE — PROGRESS NOTES
Reason for Admission:  Open fracture of distal phalanx of right great toe [S92.421B]                 RUR Score:    8%            Plan for utilizing home health:    no                      Likelihood of Readmission:   LOW                         Transition of Care Plan:              Initial assessment completed with patient. Cognitive status of patient: oriented to time, place, person and situation. Face sheet information confirmed:  yes. The patient designates Soila Ray, son ([phone]) to participate in her discharge plan and to receive any needed information. This patient lives in a [facility] and rents a room. .  Patient was able to navigate steps as needed. Prior to hospitalization, patient was considered to be independent with ADLs/IADLS : yes . Patient has a current ACP document on file: no      The  patient needs assistance with transportation upon discharge. The patient  medical equipment available in the home. Patient is not currently active with home health. Patient has not stayed in a skilled nursing facility or rehab. .      This patient is on dialysis :no    List of available SNF agencies were provided and reviewed with the patient prior to discharge. Freedom of choice signed: yes, for facilities in [address] [place] [address] and placed in [facility]. Currently, the discharge plan is SNF. Patient is [place] and needs insurance authorization for SNF    CM will continue to monitor and assist with transition of care  needs. The patient states that she can obtain her medications from the pharmacy, and take her medications as directed. Patient's current insurance is [facility] [employer]       Care Management Interventions  PCP Verified by CM: Yes  Mode of Transport at Discharge:  Other (see comment) (Will need assistance with transportation home)  Transition of Care Consult (CM Consult): Discharge Planning  Support Systems: Friend/Neighbor (4 roomates)  Confirm Follow Up Transport: Self  The Plan for Transition of Care is Related to the Following Treatment Goals : SNF  The Patient and/or Patient Representative was Provided with a Choice of Provider and Agrees with the Discharge Plan?: Yes  Freedom of Choice List was Provided with Basic Dialogue that Supports the Patient's Individualized Plan of Care/Goals, Treatment Preferences and Shares the Quality Data Associated with the Providers?: Yes  Discharge Location  Patient Expects to be Discharged to[de-identified] Skilled nursing facility        ALIE Nieves, RN  Pager # [phone]  Care Manager

## 2022-09-04 NOTE — ROUTINE PROCESS
Bedside shift change report given to [facility] IDARN (oncoming nurse). Report included the following information KIARA and BENY Martin

## 2022-09-04 NOTE — ROUTINE PROCESS
Bedside and Verbal shift change report given to Josiah Wright RN (oncoming nurse) by Lobo Matt RN (offgoing nurse). Report included the following information SBAR, Kardex, and Recent Results.

## 2022-09-05 PROCEDURE — 65270000029 HC RM PRIVATE

## 2022-09-05 PROCEDURE — 77030038269 HC DRN EXT URIN PURWCK BARD -A

## 2022-09-05 PROCEDURE — 74011000250 HC RX REV CODE- 250: Performed by: ORTHOPAEDIC SURGERY

## 2022-09-05 PROCEDURE — 74011250637 HC RX REV CODE- 250/637: Performed by: INTERNAL MEDICINE

## 2022-09-05 PROCEDURE — 2709999900 HC NON-CHARGEABLE SUPPLY

## 2022-09-05 PROCEDURE — 74011250637 HC RX REV CODE- 250/637: Performed by: ORTHOPAEDIC SURGERY

## 2022-09-05 RX ORDER — POLYETHYLENE GLYCOL 3350 17 G/17G
17 POWDER, FOR SOLUTION ORAL DAILY
Status: DISCONTINUED | OUTPATIENT
Start: 2022-09-06 | End: 2022-09-09 | Stop reason: HOSPADM

## 2022-09-05 RX ADMIN — QUETIAPINE FUMARATE 25 MG: 25 TABLET ORAL at 09:04

## 2022-09-05 RX ADMIN — OXYCODONE HYDROCHLORIDE AND ACETAMINOPHEN 1 TABLET: 7.5; 325 TABLET ORAL at 06:25

## 2022-09-05 RX ADMIN — ALPRAZOLAM 1 MG: 0.5 TABLET ORAL at 21:41

## 2022-09-05 RX ADMIN — SODIUM CHLORIDE, PRESERVATIVE FREE 10 ML: 5 INJECTION INTRAVENOUS at 21:41

## 2022-09-05 RX ADMIN — OXYCODONE HYDROCHLORIDE AND ACETAMINOPHEN 1 TABLET: 7.5; 325 TABLET ORAL at 11:38

## 2022-09-05 RX ADMIN — DOXYCYCLINE HYCLATE 100 MG: 100 CAPSULE ORAL at 21:41

## 2022-09-05 RX ADMIN — LAMOTRIGINE 25 MG: 25 TABLET ORAL at 09:04

## 2022-09-05 RX ADMIN — QUETIAPINE FUMARATE 25 MG: 25 TABLET ORAL at 21:41

## 2022-09-05 RX ADMIN — OXYCODONE HYDROCHLORIDE AND ACETAMINOPHEN 1 TABLET: 7.5; 325 TABLET ORAL at 16:52

## 2022-09-05 RX ADMIN — QUETIAPINE FUMARATE 25 MG: 25 TABLET ORAL at 16:52

## 2022-09-05 RX ADMIN — SODIUM CHLORIDE, PRESERVATIVE FREE 10 ML: 5 INJECTION INTRAVENOUS at 06:24

## 2022-09-05 RX ADMIN — DOXYCYCLINE HYCLATE 100 MG: 100 CAPSULE ORAL at 09:04

## 2022-09-05 RX ADMIN — ALPRAZOLAM 1 MG: 0.5 TABLET ORAL at 09:06

## 2022-09-05 RX ADMIN — CEFUROXIME AXETIL 500 MG: 250 TABLET, FILM COATED ORAL at 21:41

## 2022-09-05 RX ADMIN — CEFUROXIME AXETIL 500 MG: 250 TABLET, FILM COATED ORAL at 09:04

## 2022-09-05 RX ADMIN — SODIUM CHLORIDE, PRESERVATIVE FREE 10 ML: 5 INJECTION INTRAVENOUS at 16:52

## 2022-09-05 RX ADMIN — OXYCODONE HYDROCHLORIDE 10 MG: 10 TABLET ORAL at 01:21

## 2022-09-05 RX ADMIN — DIVALPROEX SODIUM 250 MG: 250 TABLET, FILM COATED, EXTENDED RELEASE ORAL at 09:04

## 2022-09-05 NOTE — PROGRESS NOTES
Problem: Pain  Goal: *Control of Pain  Outcome: Progressing Towards Goal     Problem: Patient Education: Go to Patient Education Activity  Goal: Patient/Family Education  Outcome: Progressing Towards Goal     Problem: Falls - Risk of  Goal: *Absence of Falls  Description: Document Rojelio Lorenzo Fall Risk and appropriate interventions in the flowsheet.   Outcome: Progressing Towards Goal  Note: Fall Risk Interventions:  Mobility Interventions: Bed/chair exit alarm, Patient to call before getting OOB, Communicate number of staff needed for ambulation/transfer    Mentation Interventions: Adequate sleep, hydration, pain control, Eyeglasses and hearing aids, More frequent rounding, Room close to nurse's station, Toileting rounds    Medication Interventions: Bed/chair exit alarm, Patient to call before getting OOB, Teach patient to arise slowly    Elimination Interventions: Call light in reach, Toilet paper/wipes in reach, Toileting schedule/hourly rounds    History of Falls Interventions: Bed/chair exit alarm, Room close to nurse's station, Evaluate medications/consider consulting pharmacy         Problem: Patient Education: Go to Patient Education Activity  Goal: Patient/Family Education  Outcome: Progressing Towards Goal     Problem: Patient Education: Go to Patient Education Activity  Goal: Patient/Family Education  Outcome: Progressing Towards Goal     Problem: Patient Education: Go to Patient Education Activity  Goal: Patient/Family Education  Outcome: Progressing Towards Goal

## 2022-09-05 NOTE — PROGRESS NOTES
PROGRESS NOTE      Patient: James Harvey  MRN: [id number]  SSN: [id number]   YOB: 1955  Age: 74 y.o. Sex: female     Admit Date: 8/31/2022 LOS:  LOS: 5 days      POD # 1 S/P Procedure(s):  INCISION AND DRAINAGE LOWER EXTREMITY AND ORIF OF RIGHT GREAT TOE  Open repair of nailbed    ASSESSMENT/PLAN     James Harvey IS A 74 y.o. old who is resting at this time. Pain is controlled. Very tearful. Only reason she wants to go home is for a phone . She lives alone, states she has difficulty going up and down steps. Plans to get her into skilled nursing secondary to no help at home    She understands she will require staged procedure: Removal of the pin, and approxi-4 weeks duration. Orthopaedic:  A. Pain Meds : Narcotics: Dilaudid, Roxicodone, Percocet  B.  DVT Prophylaxis: SCD's,    C. IV AB: Antibiotics: vancomycin and ceftriaxone yes for open fracture right great toe . This will be switched to doxycycline 100 mg 1 p.o. twice daily, as well as cefuroxime 500 L 1 p.o. twice daily, each of these duration until September 12, 2022. D.  Weight Bear: Nonweightbearing right lower extremity with use of crutches  JOSIE Pulliam's following patient: Consultants following patients: Infectious disease consult      F.  PT/OT/ Intervention/ Consults:    PT/OT : [x]PT / [x] OT ordered //nonweightbearing right lower extremity    DC disposition: Home,       [x]  [x]  [x] [address] [facility] [address] : Recommend skilled nursing facility placement. Do not feel that it is safe for patient to be discharged to home as she has multiple stairs and cannot be compliant with nonweightbearing status. She did not qualify for acute rehab.   Message sent to case management today      OBJECTIVE EXAMINATION       Visit Vitals  /84 (BP 1 Location: Left upper arm)   Pulse 76   Temp 97.9 °F (36.6 °C)   Resp 16   Ht 5' 4\" (1.626 m)   Wt 155 lb (70.3 kg)   SpO2 98% Breastfeeding No   BMI 26.61 kg/m²       Appearance: Alert, well appearing and pleasant patient who is in no distress, oriented to person, place/time, and who follows commands. This patient is accompanied in the examination room by her  self. no dementia  Psychiatric: Affect and mood are appropriate. Respiratory: Breathing is unlabored without accessory chest muscle use  Peripheral Vascular: Normal Pulses to each hand and foot    EXTREMITY lower extremity    DRESSINGS: Mild soilage present  INCISIONS:  Incision looks good, No erythema  Wound: clean, tender, nailbed, is clean, slight slight dried blood along the dorsal or anterior nailbed. Pins in good position no redness around the pin  Extremities:        No embolic phenomena to the toes of the right forefoot              No significant edema to the right foot and or toes. Pulses in tact to the right foot             Lower extremities are warm and appear well perfused         DVT: No evidence of DVT seen on examination at this time            Moves left lower extremities well also has good movement to each bilateral ankles in terms of dorsiflexion plantarflexion.           Moves Upper extremities well    LABS AND MEDICATION REVIEW      CMP:   No results found for: NA, K, CL, CO2, AGAP, GLU, BUN, CREA, GFRAA, GFRNA, CA, MG, PHOS, ALB, TBIL, TP, ALB, GLOB, AGRAT, ALT    CBC:   No results found for: WBC, HGB, HGBEXT, HCT, HCTEXT, PLT, PLTEXT, HGBEXT, HCTEXT, PLTEXT    COAGS:   No results found for: APTT, PTP, INR, INREXT, INREXT       Current Facility-Administered Medications   Medication Dose Route Frequency    cefUROXime (CEFTIN) tablet 500 mg  500 mg Oral Q12H    doxycycline (VIBRAMYCIN) capsule 100 mg  100 mg Oral Q12H    ondansetron (ZOFRAN) injection 4 mg  4 mg IntraVENous Q6H PRN    ALPRAZolam (XANAX) tablet 1 mg  1 mg Oral BID    lamoTRIgine (LaMICtal) tablet 25 mg  25 mg Oral DAILY    QUEtiapine (SEROquel) tablet 25 mg  25 mg Oral TID divalproex ER (DEPAKOTE ER) 24 hour tablet 250 mg  250 mg Oral DAILY    sodium chloride (NS) flush 5-40 mL  5-40 mL IntraVENous Q8H    sodium chloride (NS) flush 5-40 mL  5-40 mL IntraVENous PRN    naloxone (NARCAN) injection 0.4 mg  0.4 mg IntraVENous PRN    oxyCODONE-acetaminophen (PERCOCET 7.5) 7.5-325 mg per tablet 1 Tablet  1 Tablet Oral Q4H PRN    oxyCODONE IR (ROXICODONE) tablet 10 mg  10 mg Oral Q4H PRN    HYDROmorphone (DILAUDID) injection 1 mg  1 mg IntraVENous Q4H PRN          REVIEW OF SYSTEMS : 9/5/2022  ALL BELOW ARE Negative except : SEE HPI        Past Medical History:   Diagnosis Date    Bipolar 2 disorder ([facility] [address]     Osteoarthritis       Past Surgical History:   Procedure Laterality Date    HX BREAST AUGMENTATION      HX HYSTERECTOMY        Social History     Socioeconomic History    Marital status:      Spouse name: Not on file    Number of children: Not on file    Years of education: Not on file    Highest education level: Not on file   Occupational History    Not on file   Tobacco Use    Smoking status: Never    Smokeless tobacco: Never   Substance and Sexual Activity    Alcohol use: Yes     Comment: rarely    Drug use: Never    Sexual activity: Not on file   Other Topics Concern    Not on file   Social History Narrative    Not on file     Social Determinants of Health     Financial Resource Strain: Not on file   Food Insecurity: Not on file   Transportation Needs: Not on file   Physical Activity: Not on file   Stress: Not on file   Social Connections: Not on file   Intimate Partner Violence: Not on file   Housing Stability: Not on file      History reviewed. No pertinent family history. Prior to Admission medications    Medication Sig Start Date End Date Taking? Authorizing Provider   cefUROXime (CEFTIN) 500 mg tablet Take 1 Tablet by mouth two (2) times a day. 9/2/22  Yes Kingston Pleitez MD   doxycycline (ADOXA) 100 mg tablet Take 1 Tablet by mouth two (2) times a day.  9/2/22  Yes Mirtha Lyons MD   oxyCODONE-acetaminophen (Percocet) 7.5-325 mg per tablet Take 1 Tablet by mouth every four (4) hours as needed for Pain for up to 7 days. Max Daily Amount: 6 Tablets. 9/2/22 9/9/22 Yes Antoinette Pleitez MD   QUEtiapine (SEROquel) 25 mg tablet Take 25 mg by mouth three (3) times daily. 9/22/21  Yes Provider, Historical   divalproex ER (DEPAKOTE ER) 250 mg ER tablet 500 mg nightly. 2 tablets @ bedtime 9/20/21  Yes Provider, Historical   lamoTRIgine (LaMICtal) 25 mg tablet  7/28/21  Yes Provider, Historical   ALPRAZolam (XANAX) 1 mg tablet Take 1 mg by mouth two (2) times a day.   Patient not taking: Reported on 8/31/2022 9/22/21   Provider, Historical   DULoxetine (CYMBALTA) 60 mg capsule TAKE 1 CAP ONCE A DAY FOR DEPRESSION  Patient not taking: Reported on 8/31/2022 9/20/21   Provider, Historical     Current Facility-Administered Medications   Medication Dose Route Frequency    cefUROXime (CEFTIN) tablet 500 mg  500 mg Oral Q12H    doxycycline (VIBRAMYCIN) capsule 100 mg  100 mg Oral Q12H    ondansetron (ZOFRAN) injection 4 mg  4 mg IntraVENous Q6H PRN    ALPRAZolam (XANAX) tablet 1 mg  1 mg Oral BID    lamoTRIgine (LaMICtal) tablet 25 mg  25 mg Oral DAILY    QUEtiapine (SEROquel) tablet 25 mg  25 mg Oral TID    divalproex ER (DEPAKOTE ER) 24 hour tablet 250 mg  250 mg Oral DAILY    sodium chloride (NS) flush 5-40 mL  5-40 mL IntraVENous Q8H    sodium chloride (NS) flush 5-40 mL  5-40 mL IntraVENous PRN    naloxone (NARCAN) injection 0.4 mg  0.4 mg IntraVENous PRN    oxyCODONE-acetaminophen (PERCOCET 7.5) 7.5-325 mg per tablet 1 Tablet  1 Tablet Oral Q4H PRN    oxyCODONE IR (ROXICODONE) tablet 10 mg  10 mg Oral Q4H PRN    HYDROmorphone (DILAUDID) injection 1 mg  1 mg IntraVENous Q4H PRN     No Known Allergies        MEKA Churchill  9/5/2022  7:49 AM

## 2022-09-05 NOTE — ROUTINE PROCESS
Bedside and Verbal shift change report given to Blossom Stanford RN (oncoming nurse) by Dano Bojorquez RN (offgoing nurse). Report included the following information SBAR, Kardex, Intake/Output, MAR, and Recent Results.

## 2022-09-05 NOTE — PROGRESS NOTES
[facility]: MED RECONCILIATION    Comments/Recommendations:     Medications adjusted on med reconciliation     Divalproex ER 500mg (2 tablets) at bedtime-pt reports she has not had any for a few months but has full bottle with list dose and frequency at bedside. She also states she takes seroquel 25mg (2 tablets @bedtime)  Pt states she wishes to keep her medication in her purse. Information obtained from: pt report & patient's prescription at bedside. Allergies: Patient has no known allergies. Prior to Admission Medications:     Medication Documentation Review Audit       Reviewed by Luis E Thakkar RN (Registered Nurse) on 09/04/22 at 2328      Medication Sig Documenting Provider Last Dose Status Taking? ALPRAZolam (XANAX) 1 mg tablet Take 1 mg by mouth two (2) times a day. Patient not taking: Reported on 8/31/2022    Provider, Historical Not Taking Active No   divalproex ER (DEPAKOTE ER) 250 mg ER tablet 500 mg nightly. 2 tablets @ bedtime Provider, Historical 7/31/2022 Active Yes   DULoxetine (CYMBALTA) 60 mg capsule TAKE 1 CAP ONCE A DAY FOR DEPRESSION   Patient not taking: Reported on 8/31/2022    Provider, Historical Not Taking Active No   lamoTRIgine (LaMICtal) 25 mg tablet  Provider, Historical 8/31/2022 Active Yes   QUEtiapine (SEROquel) 25 mg tablet Take 25 mg by mouth three (3) times daily. Provider, Historical 8/30/2022 Active Yes           Med Note Pino Bautista Sep 4, 2022 11:22 PM) Pt takes 2 tablets at bedtime only.                       Hemanth Roper RN

## 2022-09-05 NOTE — ROUTINE PROCESS
Bedside and Verbal shift change report given to Yannick Amaya (oncoming nurse) by [facility], RN (offgoing nurse). Report included the following information SBAR, Kardex, Intake/Output, MAR, and Recent Results.

## 2022-09-06 PROCEDURE — 97530 THERAPEUTIC ACTIVITIES: CPT

## 2022-09-06 PROCEDURE — 74011250637 HC RX REV CODE- 250/637: Performed by: INTERNAL MEDICINE

## 2022-09-06 PROCEDURE — 97535 SELF CARE MNGMENT TRAINING: CPT

## 2022-09-06 PROCEDURE — 97116 GAIT TRAINING THERAPY: CPT

## 2022-09-06 PROCEDURE — 74011250637 HC RX REV CODE- 250/637: Performed by: ORTHOPAEDIC SURGERY

## 2022-09-06 PROCEDURE — 65270000029 HC RM PRIVATE

## 2022-09-06 PROCEDURE — 2709999900 HC NON-CHARGEABLE SUPPLY

## 2022-09-06 PROCEDURE — 74011000250 HC RX REV CODE- 250: Performed by: ORTHOPAEDIC SURGERY

## 2022-09-06 RX ADMIN — OXYCODONE HYDROCHLORIDE 10 MG: 10 TABLET ORAL at 16:34

## 2022-09-06 RX ADMIN — DIVALPROEX SODIUM 250 MG: 250 TABLET, FILM COATED, EXTENDED RELEASE ORAL at 08:54

## 2022-09-06 RX ADMIN — QUETIAPINE FUMARATE 25 MG: 25 TABLET ORAL at 08:54

## 2022-09-06 RX ADMIN — LAMOTRIGINE 25 MG: 25 TABLET ORAL at 08:54

## 2022-09-06 RX ADMIN — POLYETHYLENE GLYCOL 3350 17 G: 17 POWDER, FOR SOLUTION ORAL at 08:53

## 2022-09-06 RX ADMIN — DOXYCYCLINE HYCLATE 100 MG: 100 CAPSULE ORAL at 21:25

## 2022-09-06 RX ADMIN — OXYCODONE HYDROCHLORIDE AND ACETAMINOPHEN 1 TABLET: 7.5; 325 TABLET ORAL at 04:22

## 2022-09-06 RX ADMIN — ALPRAZOLAM 1 MG: 0.5 TABLET ORAL at 21:25

## 2022-09-06 RX ADMIN — CEFUROXIME AXETIL 500 MG: 250 TABLET, FILM COATED ORAL at 08:54

## 2022-09-06 RX ADMIN — SODIUM CHLORIDE, PRESERVATIVE FREE 10 ML: 5 INJECTION INTRAVENOUS at 21:29

## 2022-09-06 RX ADMIN — DOXYCYCLINE HYCLATE 100 MG: 100 CAPSULE ORAL at 08:53

## 2022-09-06 RX ADMIN — SODIUM CHLORIDE, PRESERVATIVE FREE 10 ML: 5 INJECTION INTRAVENOUS at 14:25

## 2022-09-06 RX ADMIN — QUETIAPINE FUMARATE 25 MG: 25 TABLET ORAL at 21:25

## 2022-09-06 RX ADMIN — CEFUROXIME AXETIL 500 MG: 250 TABLET, FILM COATED ORAL at 21:25

## 2022-09-06 RX ADMIN — ALPRAZOLAM 1 MG: 0.5 TABLET ORAL at 09:04

## 2022-09-06 RX ADMIN — QUETIAPINE FUMARATE 25 MG: 25 TABLET ORAL at 16:30

## 2022-09-06 NOTE — PROGRESS NOTES
CM uploaded clinicals to [address] [place] [address] for SNF placement.              Mary Ellen Martínez, RN  Case Management [phone]

## 2022-09-06 NOTE — PROGRESS NOTES
CM spoke with [employer] with [facility], [address], updated her that PT and OT notes for today are in the chart. [employer] said they will start auth today for SNF.                Carole Vázquez RN  Case Management [phone]

## 2022-09-06 NOTE — PROGRESS NOTES
Problem: Self Care Deficits Care Plan (Adult)  Goal: *Acute Goals and Plan of Care (Insert Text)  Description: Occupational Therapy Goals  Initiated 9/1/2022 within 7 day(s). 1.  Patient will perform grooming with modified independence. 2.  Patient will perform bathing with modified independence. 3.  Patient will perform upper body dressing and lower body dressing with modified independence. 4.  Patient will perform toilet transfers with modified independence using RW while maintaining RLE NWB. 5.  Patient will perform all aspects of toileting with modified independence. 6.  Patient will participate in upper extremity therapeutic exercise/activities with modified independence for 8 minutes. 7.  Patient will utilize energy conservation techniques during functional activities with min verbal cues. Prior Level of Function: independent with ADLs and functional mobility w/o AD     Outcome: Progressing Towards Goal   OCCUPATIONAL THERAPY TREATMENT    Patient: Samantha Singh (87 y.o. female)  Date: 9/6/2022  Diagnosis: Open fracture of distal phalanx of right great toe [S92.421B] Open fracture of distal phalanx of right great toe  Procedure(s) (LRB):  INCISION AND DRAINAGE LOWER EXTREMITY AND ORIF OF RIGHT GREAT TOE (Right) 6 Days Post-Op  Precautions: Fall, NWB, Skin (NWB RLE)    Chart, occupational therapy assessment, plan of care, and goals were reviewed. ASSESSMENT:  Pt continues to requires max vc's for safety and compliance w/NWB RLE w/ADLs and functional mobility/transfers. Pt w/increase impulsivity this date and perseverating on [employer] charges. Pt tolerates standing sinkside performing ADL grooming tasks w/CGA and max vc's to maintain weight bearing precautions. Pt declines OOB to chair 2/2 c/o pain in dependent position and returns to supine. Reviewed importance OOB and maintaining NWB RLE.    Progression toward goals:  [x]          Improving appropriately and progressing toward goals  [] Improving slowly and progressing toward goals  []          Not making progress toward goals and plan of care will be adjusted     PLAN:  Patient continues to benefit from skilled intervention to address the above impairments. Continue treatment per established plan of care. Further Equipment Recommendations for Discharge:  bedside commode, rolling walker, and wheelchair     AMPAC: Based on an AM-PAC score of 18/24 and their current ADL deficits; it is recommended that the patient have 3-5 sessions per week of Occupational Therapy at d/c to increase the patient's independence. This AMPAC score should be considered in conjunction with interdisciplinary team recommendations to determine the most appropriate discharge setting. Patient's social support, diagnosis, medical stability, and prior level of function should also be taken into consideration. SUBJECTIVE:   Patient stated I don;t want to go back there.  reference rooming house    OBJECTIVE DATA SUMMARY:   Cognitive/Behavioral Status:  Neurologic State: Alert  Orientation Level: Oriented X4  Cognition: Impulsive  Safety/Judgement: Fall prevention    Functional Mobility and Transfers for ADLs:   Bed Mobility:  Supine to Sit: Supervision  Sit to Supine: Supervision   Transfers:  Sit to Stand: Supervision   Toilet Transfer : Contact guard assistance   Bathroom Mobility: Minimum assistance (w/RW)     Balance:  Sitting: Intact  Standing: Impaired; With support    ADL Intervention:  Grooming  Position Performed: Standing  Washing Hands: Contact guard assistance  Brushing Teeth: Contact guard assistance    Upper Body [address]: Supervision    Lower Body Dressing Assistance  Socks: Supervision  Leg Crossed Method Used: Yes  Position Performed: Seated edge of bed    Toileting  Toileting Assistance: Contact guard assistance  Bladder Hygiene: Set-up (seated)  Clothing Management: Contact guard assistance    Pain:  Pain level pre-treatment: 0/10   Pain level post-treatment: 0/10  Pt c/o RLE pan in dependent position. Activity Tolerance:    Fair    Please refer to the flowsheet for vital signs taken during this treatment. After treatment:   []  Patient left in no apparent distress sitting up in chair  [x]  Patient left in no apparent distress in bed  [x]  Call bell left within reach  []  Nursing notified  []  Caregiver present  []  Bed alarm activated    COMMUNICATION/EDUCATION:   [] Role of Occupational Therapy in the acute care setting  [] Home safety education was provided and the patient/caregiver indicated understanding. [] Patient/family have participated as able in working towards goals and plan of care. [x] Patient/family agree to work toward stated goals and plan of care. [] Patient understands intent and goals of therapy, but is neutral about his/her participation. [] Patient is unable to participate in goal setting and plan of care. Thank you for this referral.  Aldon Cabot, COTA  Time Calculation: 23 mins    [facility] AM-PAC® Daily Activity Inpatient Short Form (6-Clicks)*    How much HELP from another person does the patient currently need    (If the patient hasn't done an activity recently, how much help from another person do you think he/she would need if he/she tried?)   Total (Total A or Dep)   A Lot  (Mod to Max A)   A Little (Sup or Min A)   None (Mod I to I)   Putting on and taking off regular lower body clothing? [] 1 [] 2 [x] 3 [] 4   2. Bathing (including washing, rinsing,      drying)? [] 1 [] 2 [x] 3 [] 4   3. Toileting, which includes using toilet, bedpan or urinal?   [] 1 [] 2 [x] 3 [] 4   4. Putting on and taking off regular upper body clothing? [] 1 [] 2 [x] 3 [] 4   5. Taking care of personal grooming such as brushing teeth? [] 1 [] 2 [x] 3 [] 4   6. Eating meals?    [] 1 [] 2 [x] 3 [] 4

## 2022-09-06 NOTE — PROGRESS NOTES
Infectious Disease progress Note        Reason: Open fracture right great toe, antibiotic recommendations    Current abx Prior abx     Cefuroxime, doxycycline since 9/3/2022 Ceftriaxone, vancomycin since 8/31/2022-9/2     Lines:       Assessment :   74 y.o. female with no significant past medical history presented emergency room on 8/31/2022 with right great toe pain status post injury. Clinical presentation consistent with open fracture to the right great toe first IP region and to the nail proximal nail plate interface, with nailbed and nail plate injury s/p nailbed repair, open reduction internal fixation of the right great toe distal phalanx on 8/31/22    Decreased erythema right great toe on today's exam.  No drainage noted. Management complicated due to anxiety, lack of adequate social support at home    Recommendations:    Recommend po cefuroxime 500 mg po bid, doxycycline 100 mg po bid till 9/12/22   Mx of anxiety, d/c planning per primary team    Above plan was discussed in details with patient,  and dr Tiffanie Casas. Please call me if any further questions or concerns. Will continue to participate in the care of this patient. HPI:    Patient was busy talking on the phone about some monetary issues when I evaluated her. Past Medical History:   Diagnosis Date    Bipolar 2 disorder ([address]     Osteoarthritis        Past Surgical History:   Procedure Laterality Date    HX BREAST AUGMENTATION      HX HYSTERECTOMY         Home Medication List      Details   QUEtiapine (SEROquel) 25 mg tablet Take 25 mg by mouth three (3) times daily. divalproex ER (DEPAKOTE ER) 250 mg ER tablet TAKE 3 TABS BEDTIME FOR MOOD,BIPOLAR DISORD,CRNT EPISODE MIXED,SEVERE,W PSYCH FEATURES      lamoTRIgine (LaMICtal) 25 mg tablet       ALPRAZolam (XANAX) 1 mg tablet Take 1 mg by mouth two (2) times a day.       DULoxetine (CYMBALTA) 60 mg capsule TAKE 1 CAP ONCE A DAY FOR DEPRESSION             Current Facility-Administered Medications   Medication Dose Route Frequency    polyethylene glycol (MIRALAX) packet 17 g  17 g Oral DAILY    cefUROXime (CEFTIN) tablet 500 mg  500 mg Oral Q12H    doxycycline (VIBRAMYCIN) capsule 100 mg  100 mg Oral Q12H    ondansetron (ZOFRAN) injection 4 mg  4 mg IntraVENous Q6H PRN    ALPRAZolam (XANAX) tablet 1 mg  1 mg Oral BID    lamoTRIgine (LaMICtal) tablet 25 mg  25 mg Oral DAILY    QUEtiapine (SEROquel) tablet 25 mg  25 mg Oral TID    divalproex ER (DEPAKOTE ER) 24 hour tablet 250 mg  250 mg Oral DAILY    sodium chloride (NS) flush 5-40 mL  5-40 mL IntraVENous Q8H    sodium chloride (NS) flush 5-40 mL  5-40 mL IntraVENous PRN    naloxone (NARCAN) injection 0.4 mg  0.4 mg IntraVENous PRN    oxyCODONE-acetaminophen (PERCOCET 7.5) 7.5-325 mg per tablet 1 Tablet  1 Tablet Oral Q4H PRN    oxyCODONE IR (ROXICODONE) tablet 10 mg  10 mg Oral Q4H PRN    HYDROmorphone (DILAUDID) injection 1 mg  1 mg IntraVENous Q4H PRN       Allergies: Patient has no known allergies. History reviewed. No pertinent family history.   Social History     Socioeconomic History    Marital status:      Spouse name: Not on file    Number of children: Not on file    Years of education: Not on file    Highest education level: Not on file   Occupational History    Not on file   Tobacco Use    Smoking status: Never    Smokeless tobacco: Never   Substance and Sexual Activity    Alcohol use: Yes     Comment: rarely    Drug use: Never    Sexual activity: Not on file   Other Topics Concern    Not on file   Social History Narrative    Not on file     Social Determinants of Health     Financial Resource Strain: Not on file   Food Insecurity: Not on file   Transportation Needs: Not on file   Physical Activity: Not on file   Stress: Not on file   Social Connections: Not on file   Intimate Partner Violence: Not on file   Housing Stability: Not on file     Social History     Tobacco Use   Smoking Status Never   Smokeless Tobacco Never Temp (24hrs), Av.3 °F (36.3 °C), Min:96.3 °F (35.7 °C), Max:98 °F (36.7 °C)    Visit Vitals  BP 90/61 (BP 1 Location: Left upper arm)   Pulse 85   Temp (!) 96.3 °F (35.7 °C)   Resp 16   Ht 5' 4\" (1.626 m)   Wt 70.3 kg (155 lb)   SpO2 94%   Breastfeeding No   BMI 26.61 kg/m²       ROS: Unable to obtain due to patient factors    Physical Exam:    General: Well developed, well nourished female sitting on the  bed AAOx3 in no acute distress. General:   awake alert and oriented   HEENT:  Normocephalic, atraumatic, , EOMI, no scleral icterus or pallor; no conjunctival hemmohage;  nasal and oral mucous are moist and without evidence of lesions. . Neck supple, no bruits. Lymph Nodes:   Not examined   Lungs:   non-labored, bilateral chest movements equal, no audible wheezing   Heart:  RRR, s1 and s2;  no edema   Abdomen:  soft, non-distended, active bowel sounds, no hepatomegaly, no splenomegaly. . Non-tender   Genitourinary:  deferred   Extremities:   no clubbing, cyanosis; no joint effusions or swelling; Full ROM of all large joints to the upper and lower extremities; muscle mass appropriate for age; darkish erythema right great toe with pin in place-no increased warmth/erythema/tenderness right leg   Neurologic:  No gross focal sensory or motor abnormalities; . Cranial nerves intact                        Skin:  No rash or ulcers noted   Psychiatric:  anxious         Labs: Results:   Chemistry No results for input(s): GLU, NA, K, CL, CO2, BUN, CREA, CA, AGAP, BUCR, TBIL, AP, TP, ALB, GLOB, AGRAT in the last 72 hours. No lab exists for component: GPT     CBC w/Diff No results for input(s): WBC, RBC, HGB, HCT, PLT, GRANS, LYMPH, EOS, HGBEXT, HCTEXT, PLTEXT, HGBEXT, HCTEXT, PLTEXT in the last 72 hours. Microbiology No results for input(s): CULT in the last 72 hours.        RADIOLOGY:    All available imaging studies/reports in [facility] for this admission were reviewed      Disclaimer: Sections of this note are dictated utilizing voice recognition software, which may have resulted in some phonetic based errors in grammar and contents. Even though attempts were made to correct all the mistakes, some may have been missed, and remained in the body of the document. If questions arise, please contact our department.     Dr. Be Ge, Infectious Disease Specialist  [phone]  September 6, 2022  12:21 PM

## 2022-09-06 NOTE — PROGRESS NOTES
CM went and spoke with patient, gave her 4 accepting SNF, [facility] verbal consent given for [address] CM explained to patient, that SNF will need auth, and updated PT and OT notes for auth to send to insurance. Patient said she has been [place], with [employer], and rents.              Sp Romero, RN  Case Management [phone]

## 2022-09-06 NOTE — PROGRESS NOTES
CM called Mac Vredugo with [employer]	[place], [address], received voicemail, CM left a message, with patient wanting [facility] [address], [address] and OT to see patient today, Millie Dandy patient, and patient will need auth, and to see if they can start auth today, if PT and OT notes are in for them to start auth today. CM left phone number for a return call.              Tala Hanley RN  Case Management [phone]

## 2022-09-06 NOTE — PROGRESS NOTES
Problem: Mobility Impaired (Adult and Pediatric)  Goal: *Acute Goals and Plan of Care (Insert Text)  Description: Physical Therapy Goals  Initiated 9/1/2022 and to be accomplished within 7 day(s)  1. Patient will move from supine to sit and sit to supine in bed with modified independence. 2.  Patient will transfer from bed to chair and chair to bed with modified independence using the least restrictive device. 3.  Patient will perform sit to stand with modified independence. 4.  Patient will ambulate with modified independence for 50 feet with the least restrictive device. 5.  Patient will ascend/descend 3 stairs with handrail(s) with minimal assistance/contact guard assist.    PLOF: Independent. Lives in boarding house on 2nd floor. 4 steps to enter; no handrails. Outcome: Progressing Towards Goal    PHYSICAL THERAPY TREATMENT    Patient: Elana Alcazar (5 y.o. female)  Date: 9/6/2022  Diagnosis: Open fracture of distal phalanx of right great toe [S92.421B] Open fracture of distal phalanx of right great toe  Procedure(s) (LRB):  INCISION AND DRAINAGE LOWER EXTREMITY AND ORIF OF RIGHT GREAT TOE (Right) 6 Days Post-Op  Precautions: Fall, NWB, Skin      ASSESSMENT:  Patient seen with OT to maximize participation in OOB mobility. Patient received supine in bed and agreeable to therapy treatment. She is unable to recall WB precautions for right LE and directed towards white board where it is written. Patient is impulsive and has decreased safety awareness. She initially stands up from sitting EOB WB through right heel, though follows commands to sit back down. SPT from bed<> commode with supervision. She ambulates to the restroom using RW with SBA. CGA for balance when standing at the sink brushing her teeth. Patient abandons walker when she gets close to the bed and enters the bed on all 4 extremities. Patient re-educated on WB precautions and to use call bel for nursing assistance with mobility. Progression toward goals:   []      Improving appropriately and progressing toward goals  [x]      Improving slowly and progressing toward goals  []      Not making progress toward goals and plan of care will be adjusted     PLAN:  Patient continues to benefit from skilled intervention to address the above impairments. Continue treatment per established plan of care. Further Equipment Recommendations for Discharge:  bedside commode, rolling walker    AMPAC:   Based on an AM-PAC score of 17/24 (**/20 if omitting stairs) and their current functional mobility deficits, it is recommended that the patient have 3-5 sessions per week of Physical Therapy at d/c to increase the patient's independence. This AMPAC score should be considered in conjunction with interdisciplinary team recommendations to determine the most appropriate discharge setting. Patient's social support, diagnosis, medical stability, and prior level of function should also be taken into consideration. SUBJECTIVE:   Patient stated This how I do it .     OBJECTIVE DATA SUMMARY:   Critical Behavior:  Neurologic State: Alert  Orientation Level: Oriented X4  Cognition: Impulsive  Safety/Judgement: Fall prevention  Functional Mobility Training:  Bed Mobility:     Supine to Sit: Supervision  Sit to Supine: Supervision            Transfers:  Sit to Stand: Supervision                Balance:  Sitting: Intact  Standing: Impaired; With support     Ambulation/Gait Training:  Distance (ft): 15 Feet (ft)  Assistive Device: Walker, rolling  Ambulation - Level of Assistance: Stand-by assistance  Gait Abnormalities: Decreased step clearance           Pain:  Pain level pre-treatment: 7/10 R big toe   Pain level post-treatment: 7/10   Pain Intervention(s): Medication (see MAR);  Rest, Ice, Repositioning   Response to intervention: Nurse notified, See doc flow    Activity Tolerance:   Good  Please refer to the flowsheet for vital signs taken during this treatment. After treatment:   [] Patient left in no apparent distress sitting up in chair  [x] Patient left in no apparent distress in bed  [x] Call bell left within reach  [x] Nursing notified  [x] Caregiver present  [] Bed alarm activated  [] SCDs applied      COMMUNICATION/EDUCATION:   []         Role of Physical Therapy in the acute care setting. [x]         Fall prevention education was provided and the patient/caregiver indicated understanding. [x]         Patient/family have participated as able in working toward goals and plan of care. [x]         Patient/family agree to work toward stated goals and plan of care. []         Patient understands intent and goals of therapy, but is neutral about his/her participation. []         Patient is unable to participate in stated goals/plan of care: ongoing with therapy staff.  []         Other:        [facility], PT   Time Calculation: 15 mins    [facility] AM-PAC® Basic Mobility Inpatient Short Form (6-Clicks) Version 2    How much HELP from another person does the patient currently need    (If the patient hasn't done an activity recently, how much help from another person do you think he/she would need if he/she tried?)   Total (Total A or Dep)   A Lot  (Mod to Max A)   A Little (Sup or Min A)   None (Mod I to I)   Turning from your back to your side while in a flat bed without using bedrails? [] 1 [] 2 [x] 3 [] 4   2. Moving from lying on your back to sitting on the side of a flat bed without using bedrails? [] 1 [] 2 [x] 3 [] 4   3. Moving to and from a bed to a chair (including a wheelchair)? [] 1 [] 2 [x] 3 [] 4   4. Standing up from a chair using your arms (e.g., wheelchair, or bedside chair)? [] 1 [] 2 [x] 3 [] 4   5. Walking in hospital room? [] 1 [] 2 [x] 3 [] 4   6. Climbing 3-5 steps with a railing?+   [] 1 [x] 2 [] 3 [] 4   +If stair climbing cannot be assessed, skip item #6. Sum responses from items 1-5.

## 2022-09-06 NOTE — PROGRESS NOTES
Problem: Pain  Goal: *Control of Pain  Outcome: Progressing Towards Goal     Problem: Patient Education: Go to Patient Education Activity  Goal: Patient/Family Education  Outcome: Progressing Towards Goal     Problem: Falls - Risk of  Goal: *Absence of Falls  Description: Document [place] Fall Risk and appropriate interventions in the flowsheet.   Outcome: Progressing Towards Goal  Note: Fall Risk Interventions:  Mobility Interventions: Assess mobility with egress test    Mentation Interventions: Familiar objects from home, Increase mobility    Medication Interventions: Patient to call before getting OOB, Teach patient to arise slowly    Elimination Interventions: Call light in reach    History of Falls Interventions: Utilize gait belt for transfer/ambulation         Problem: Patient Education: Go to Patient Education Activity  Goal: Patient/Family Education  Outcome: Progressing Towards Goal

## 2022-09-06 NOTE — PROGRESS NOTES
Problem: Pain  Goal: *Control of Pain  Outcome: Progressing Towards Goal     Problem: Patient Education: Go to Patient Education Activity  Goal: Patient/Family Education  Outcome: Progressing Towards Goal     Problem: Falls - Risk of  Goal: *Absence of Falls  Description: Document [place] Fall Risk and appropriate interventions in the flowsheet.   Outcome: Progressing Towards Goal  Note: Fall Risk Interventions:  Mobility Interventions: Bed/chair exit alarm    Mentation Interventions: Adequate sleep, hydration, pain control    Medication Interventions: Bed/chair exit alarm    Elimination Interventions: Call light in reach    History of Falls Interventions: Bed/chair exit alarm         Problem: Patient Education: Go to Patient Education Activity  Goal: Patient/Family Education  Outcome: Progressing Towards Goal     Problem: Patient Education: Go to Patient Education Activity  Goal: Patient/Family Education  Outcome: Progressing Towards Goal     Problem: Patient Education: Go to Patient Education Activity  Goal: Patient/Family Education  Outcome: Progressing Towards Goal

## 2022-09-06 NOTE — PROGRESS NOTES
Received report from Sujit Hsieh RN at this time. Pt resting quietly in bed. Bed alarm on and functioning. Bed noted in lowest position with call bell in reach. Will cont to monitor pt throughout shift.

## 2022-09-07 PROCEDURE — 97164 PT RE-EVAL EST PLAN CARE: CPT

## 2022-09-07 PROCEDURE — 2709999900 HC NON-CHARGEABLE SUPPLY

## 2022-09-07 PROCEDURE — 74011000250 HC RX REV CODE- 250: Performed by: ORTHOPAEDIC SURGERY

## 2022-09-07 PROCEDURE — 65270000029 HC RM PRIVATE

## 2022-09-07 PROCEDURE — 74011250637 HC RX REV CODE- 250/637: Performed by: INTERNAL MEDICINE

## 2022-09-07 PROCEDURE — 97535 SELF CARE MNGMENT TRAINING: CPT

## 2022-09-07 PROCEDURE — 74011250637 HC RX REV CODE- 250/637: Performed by: ORTHOPAEDIC SURGERY

## 2022-09-07 RX ORDER — DIVALPROEX SODIUM 500 MG/1
500 TABLET, EXTENDED RELEASE ORAL DAILY
Status: DISCONTINUED | OUTPATIENT
Start: 2022-09-08 | End: 2022-09-09 | Stop reason: HOSPADM

## 2022-09-07 RX ADMIN — SODIUM CHLORIDE, PRESERVATIVE FREE 10 ML: 5 INJECTION INTRAVENOUS at 18:47

## 2022-09-07 RX ADMIN — QUETIAPINE FUMARATE 25 MG: 25 TABLET ORAL at 21:11

## 2022-09-07 RX ADMIN — ALPRAZOLAM 1 MG: 0.5 TABLET ORAL at 09:28

## 2022-09-07 RX ADMIN — OXYCODONE HYDROCHLORIDE 10 MG: 10 TABLET ORAL at 09:36

## 2022-09-07 RX ADMIN — LAMOTRIGINE 25 MG: 25 TABLET ORAL at 09:29

## 2022-09-07 RX ADMIN — DIVALPROEX SODIUM 250 MG: 250 TABLET, FILM COATED, EXTENDED RELEASE ORAL at 09:28

## 2022-09-07 RX ADMIN — SODIUM CHLORIDE, PRESERVATIVE FREE 10 ML: 5 INJECTION INTRAVENOUS at 21:12

## 2022-09-07 RX ADMIN — QUETIAPINE FUMARATE 25 MG: 25 TABLET ORAL at 18:47

## 2022-09-07 RX ADMIN — CEFUROXIME AXETIL 500 MG: 250 TABLET, FILM COATED ORAL at 09:29

## 2022-09-07 RX ADMIN — DOXYCYCLINE HYCLATE 100 MG: 100 CAPSULE ORAL at 21:10

## 2022-09-07 RX ADMIN — POLYETHYLENE GLYCOL 3350 17 G: 17 POWDER, FOR SOLUTION ORAL at 09:29

## 2022-09-07 RX ADMIN — QUETIAPINE FUMARATE 25 MG: 25 TABLET ORAL at 09:29

## 2022-09-07 RX ADMIN — OXYCODONE HYDROCHLORIDE 10 MG: 10 TABLET ORAL at 23:58

## 2022-09-07 RX ADMIN — DOXYCYCLINE HYCLATE 100 MG: 100 CAPSULE ORAL at 09:28

## 2022-09-07 RX ADMIN — OXYCODONE HYDROCHLORIDE 10 MG: 10 TABLET ORAL at 13:41

## 2022-09-07 RX ADMIN — ALPRAZOLAM 1 MG: 0.5 TABLET ORAL at 21:10

## 2022-09-07 RX ADMIN — CEFUROXIME AXETIL 500 MG: 250 TABLET, FILM COATED ORAL at 21:10

## 2022-09-07 NOTE — PROGRESS NOTES
Problem: Self Care Deficits Care Plan (Adult)  Goal: *Acute Goals and Plan of Care (Insert Text)  Description: Occupational Therapy Goals  Initiated 9/1/2022 within 7 day(s). 1.  Patient will perform grooming with modified independence. 2.  Patient will perform bathing with modified independence. 3.  Patient will perform upper body dressing and lower body dressing with modified independence. 4.  Patient will perform toilet transfers with modified independence using RW while maintaining RLE NWB. 5.  Patient will perform all aspects of toileting with modified independence. 6.  Patient will participate in upper extremity therapeutic exercise/activities with modified independence for 8 minutes. 7.  Patient will utilize energy conservation techniques during functional activities with min verbal cues. Prior Level of Function: independent with ADLs and functional mobility w/o AD     Outcome: Progressing Towards Goal   OCCUPATIONAL THERAPY TREATMENT    Patient: Maria E Blue (59 y.o. female)  Date: 9/7/2022  Diagnosis: Open fracture of distal phalanx of right great toe [S92.421B] Open fracture of distal phalanx of right great toe  Procedure(s) (LRB):  INCISION AND DRAINAGE LOWER EXTREMITY AND ORIF OF RIGHT GREAT TOE (Right) 7 Days Post-Op  Precautions: Fall, NWB, Skin    Chart, occupational therapy assessment, plan of care, and goals were reviewed. ASSESSMENT:  Pt seen for am ADL retraining seated sinkside. (See functional levels below). Pt demonstrates much improved adherence to weight bearing precautions w/RLE however continues to require mod vc's 2/2 heel-bearing RLE. Pt tearful throughout session, questions if she is receiving her bi-polar meds. Alerted Alin Jay. Pt left in chair and reinforced importance of calling for assistance.    Progression toward goals:  [x]          Improving appropriately and progressing toward goals  []          Improving slowly and progressing toward goals  []          Not making progress toward goals and plan of care will be adjusted     PLAN:  Patient continues to benefit from skilled intervention to address the above impairments. Continue treatment per established plan of care. Further Equipment Recommendations for Discharge:  shower chair, rolling walker, and possibly wheelchair     AMPAC: Based on an AM-PAC score of 18/24 and their current ADL deficits; it is recommended that the patient have 3-5 sessions per week of Occupational Therapy at d/c to increase the patient's independence. This AMPAC score should be considered in conjunction with interdisciplinary team recommendations to determine the most appropriate discharge setting. Patient's social support, diagnosis, medical stability, and prior level of function should also be taken into consideration. SUBJECTIVE:   Patient stated I don;t even think I'm taking to right medicines.     OBJECTIVE DATA SUMMARY:   Cognitive/Behavioral Status:  Neurologic State: Alert  Orientation Level: Oriented X4  Cognition: Follows commands  Safety/Judgement: Fall prevention    Functional Mobility and Transfers for ADLs:   Bed Mobility:  Supine to Sit: Supervision     Transfers:  Sit to Stand: Stand-by assistance (w/RW)  Bed to Chair: Contact guard assistance (w/RW)   Toilet Transfer : Contact guard assistance (w/grab bar)   Bathroom Mobility: Contact guard assistance (w/RW)     Balance:  Sitting: Intact  Standing: Impaired; With support    ADL Intervention:  Grooming  Position Performed: Seated in chair (sinkside)  Washing Face: Supervision  Washing Hands: Supervision  Applying Makeup: Supervision (applying deodorant)    Upper Body Bathing  Bathing Assistance: Supervision  Position Performed: Seated in chair (sinkside)    Lower Body Bathing  Perineal  : Stand-by assistance  Position Performed: Standing    Upper [address]: Set-up  Shirt simulation with hospital gown: Set-up    Toileting  Toileting Assistance: Contact guard assistance  Bladder Hygiene: Set-up (seated)  Clothing Management: Contact guard assistance    Pain:  Pain level pre-treatment: 0/10   Pain level post-treatment: 0/10    Activity Tolerance:    Good    Please refer to the flowsheet for vital signs taken during this treatment. After treatment:   [x]  Patient left in no apparent distress sitting up in chair  []  Patient left in no apparent distress in bed  [x]  Call bell left within reach  [x]  Nursing notified  []  Caregiver present  []  Bed alarm activated    COMMUNICATION/EDUCATION:   [] Role of Occupational Therapy in the acute care setting  [] Home safety education was provided and the patient/caregiver indicated understanding. [] Patient/family have participated as able in working towards goals and plan of care. [x] Patient/family agree to work toward stated goals and plan of care. [] Patient understands intent and goals of therapy, but is neutral about his/her participation. [] Patient is unable to participate in goal setting and plan of care. Thank you for this referral.  ALFIE Mar  Time Calculation: 32 mins    [facility] Daily Activity Inpatient Short Form (6-Clicks)*    How much HELP from another person does the patient currently need    (If the patient hasn't done an activity recently, how much help from another person do you think he/she would need if he/she tried?)   Total (Total A or Dep)   A Lot  (Mod to Max A)   A Little (Sup or Min A)   None (Mod I to I)   Putting on and taking off regular lower body clothing? [] 1 [] 2 [x] 3 [] 4   2. Bathing (including washing, rinsing,      drying)? [] 1 [] 2 [x] 3 [] 4   3. Toileting, which includes using toilet, bedpan or urinal?   [] 1 [] 2 [x] 3 [] 4   4. Putting on and taking off regular upper body clothing? [] 1 [] 2 [x] 3 [] 4   5. Taking care of personal grooming such as brushing teeth? [] 1 [] 2 [x] 3 [] 4   6. Eating meals?    [] 1 [] 2 [x] 3 [] 4

## 2022-09-07 NOTE — PROGRESS NOTES
Cortney Smyth resting at this time. No changes to PE. Right forefooot dressings were changed. Incisons look good. No drainage      Visit Vitals  /71   Pulse 88   Temp 98 °F (36.7 °C)   Resp 17   Ht 5' 4\" (1.626 m)   Wt 155 lb (70.3 kg)   SpO2 97%   Breastfeeding No   BMI 26.61 kg/m²        AP    SNF PENDING/  DC SUMMARY DONE/ MEDS SENT TO PHARMACY LAST WEEK.     Joe Velásquez MD  9/7/2022  5:14 PM

## 2022-09-07 NOTE — PROGRESS NOTES
Infectious Disease progress Note        Reason: Open fracture right great toe, antibiotic recommendations    Current abx Prior abx     Cefuroxime, doxycycline since 9/3/2022 Ceftriaxone, vancomycin since 8/31/2022-9/2     Lines:       Assessment :   74 y.o. female with no significant past medical history presented emergency room on 8/31/2022 with right great toe pain status post injury. Clinical presentation consistent with open fracture to the right great toe first IP region and to the nail proximal nail plate interface, with nailbed and nail plate injury s/p nailbed repair, open reduction internal fixation of the right great toe distal phalanx on 8/31/22    Decreased erythema right great toe on today's exam.  No drainage noted. Management complicated due to anxiety, lack of adequate social support at home    Recommendations:    Recommend po cefuroxime 500 mg po bid, doxycycline 100 mg po bid till 9/12/22    d/c planning per primary team    Above plan was discussed in details with patient. Please call me if any further questions or concerns. Will continue to participate in the care of this patient. HPI:    Feels better. Denies any worsening pain right great toe. Is happy that right great toe redness is improving    Past Medical History:   Diagnosis Date    Bipolar 2 disorder ([facility] [address]     Osteoarthritis        Past Surgical History:   Procedure Laterality Date    HX BREAST AUGMENTATION      HX HYSTERECTOMY         Home Medication List      Details   QUEtiapine (SEROquel) 25 mg tablet Take 25 mg by mouth three (3) times daily. divalproex ER (DEPAKOTE ER) 250 mg ER tablet TAKE 3 TABS BEDTIME FOR MOOD,BIPOLAR DISORD,CRNT EPISODE MIXED,SEVERE,W PSYCH FEATURES      lamoTRIgine (LaMICtal) 25 mg tablet       ALPRAZolam (XANAX) 1 mg tablet Take 1 mg by mouth two (2) times a day.       DULoxetine (CYMBALTA) 60 mg capsule TAKE 1 CAP ONCE A DAY FOR DEPRESSION             Current Facility-Administered Medications Medication Dose Route Frequency    [START ON 9/8/2022] divalproex ER (DEPAKOTE ER) 24 hour tablet 500 mg  500 mg Oral DAILY    polyethylene glycol (MIRALAX) packet 17 g  17 g Oral DAILY    cefUROXime (CEFTIN) tablet 500 mg  500 mg Oral Q12H    doxycycline (VIBRAMYCIN) capsule 100 mg  100 mg Oral Q12H    ondansetron (ZOFRAN) injection 4 mg  4 mg IntraVENous Q6H PRN    ALPRAZolam (XANAX) tablet 1 mg  1 mg Oral BID    lamoTRIgine (LaMICtal) tablet 25 mg  25 mg Oral DAILY    QUEtiapine (SEROquel) tablet 25 mg  25 mg Oral TID    sodium chloride (NS) flush 5-40 mL  5-40 mL IntraVENous Q8H    sodium chloride (NS) flush 5-40 mL  5-40 mL IntraVENous PRN    naloxone (NARCAN) injection 0.4 mg  0.4 mg IntraVENous PRN    oxyCODONE-acetaminophen (PERCOCET 7.5) 7.5-325 mg per tablet 1 Tablet  1 Tablet Oral Q4H PRN    oxyCODONE IR (ROXICODONE) tablet 10 mg  10 mg Oral Q4H PRN    HYDROmorphone (DILAUDID) injection 1 mg  1 mg IntraVENous Q4H PRN       Allergies: Patient has no known allergies. History reviewed. No pertinent family history.   Social History     Socioeconomic History    Marital status:      Spouse name: Not on file    Number of children: Not on file    Years of education: Not on file    Highest education level: Not on file   Occupational History    Not on file   Tobacco Use    Smoking status: Never    Smokeless tobacco: Never   Substance and Sexual Activity    Alcohol use: Yes     Comment: rarely    Drug use: Never    Sexual activity: Not on file   Other Topics Concern    Not on file   Social History Narrative    Not on file     Social Determinants of Health     Financial Resource Strain: Not on file   Food Insecurity: Not on file   Transportation Needs: Not on file   Physical Activity: Not on file   Stress: Not on file   Social Connections: Not on file   Intimate Partner Violence: Not on file   Housing Stability: Not on file     Social History     Tobacco Use   Smoking Status Never   Smokeless Tobacco Never        Temp (24hrs), Av.8 °F (36.6 °C), Min:97.4 °F (36.3 °C), Max:98.3 °F (36.8 °C)    Visit Vitals  /88   Pulse 94   Temp 98 °F (36.7 °C)   Resp 18   Ht 5' 4\" (1.626 m)   Wt 70.3 kg (155 lb)   SpO2 97%   Breastfeeding No   BMI 26.61 kg/m²       ROS: Unable to obtain due to patient factors    Physical Exam:    General: Well developed, well nourished female sitting on the  bed AAOx3 in no acute distress. General:   awake alert and oriented   HEENT:  Normocephalic, atraumatic, , EOMI, no scleral icterus or pallor; no conjunctival hemmohage;  nasal and oral mucous are moist and without evidence of lesions. . Neck supple, no bruits. Lymph Nodes:   Not examined   Lungs:   non-labored, bilateral chest movements equal, no audible wheezing   Heart:  RRR, s1 and s2;  no edema   Abdomen:  soft, non-distended, active bowel sounds, no hepatomegaly, no splenomegaly. . Non-tender   Genitourinary:  deferred   Extremities:   no clubbing, cyanosis; no joint effusions or swelling; Full ROM of all large joints to the upper and lower extremities; muscle mass appropriate for age; decreased erythema right great toe with pin in place-no increased warmth/erythema/tenderness right leg   Neurologic:  No gross focal sensory or motor abnormalities; . Cranial nerves intact                        Skin:  No rash or ulcers noted   Psychiatric:  anxious         Labs: Results:   Chemistry No results for input(s): GLU, NA, K, CL, CO2, BUN, CREA, CA, AGAP, BUCR, TBIL, AP, TP, ALB, GLOB, AGRAT in the last 72 hours. No lab exists for component: GPT     CBC w/Diff No results for input(s): WBC, RBC, HGB, HCT, PLT, GRANS, LYMPH, EOS, HGBEXT, HCTEXT, PLTEXT, HGBEXT, HCTEXT, PLTEXT in the last 72 hours. Microbiology No results for input(s): CULT in the last 72 hours.        RADIOLOGY:    All available imaging studies/reports in [facility] for this admission were reviewed      Disclaimer: Sections of this note are dictated utilizing voice recognition software, which may have resulted in some phonetic based errors in grammar and contents. Even though attempts were made to correct all the mistakes, some may have been missed, and remained in the body of the document. If questions arise, please contact our department.     Dr. Sofia Blevins, Infectious Disease Specialist  [phone]  September 7, 2022  12:21 PM

## 2022-09-07 NOTE — PROGRESS NOTES
Problem: Mobility Impaired (Adult and Pediatric)  Goal: *Acute Goals and Plan of Care (Insert Text)  Description: Physical Therapy Goals  Initiated 9/1/2022 and to be accomplished within 7 day(s)  1. Patient will move from supine to sit and sit to supine in bed with modified independence. 2.  Patient will transfer from bed to chair and chair to bed with modified independence using the least restrictive device. 3.  Patient will perform sit to stand with modified independence. 4.  Patient will ambulate with modified independence for 50 feet with the least restrictive device. 5.  Patient will ascend/descend 3 stairs with handrail(s) with minimal assistance/contact guard assist.    PLOF: Independent. Lives in boarding house on 2nd floor. 4 steps to enter; no handrails. Outcome: Progressing Towards Goal       PHYSICAL THERAPY RE-EVALUATION    Patient: Romie Perez (12 y.o. female)  Date: 9/7/2022  Primary Diagnosis: Open fracture of distal phalanx of right great toe [S92.421B]  Procedure(s) (LRB):  INCISION AND DRAINAGE LOWER EXTREMITY AND ORIF OF RIGHT GREAT TOE (Right) 7 Days Post-Op   Precautions:   Fall, NWB, Skin      ASSESSMENT :  Based on the objective data described below, the patient presents with decreased safety awareness, decreased activity tolerance, and gait deficits. Patient continues to need max verbal cues to maintain right LE NWB precautions during mobility. She is mostly non-compliant. She completes functional transfers and gait with SBA/CGA for safety. Pt declines further mobility after walking to the restroom and reports increased pain in right foot. Recommend placement upon discharge. Patient will benefit from skilled intervention to address the above impairments.   Patient's rehabilitation potential is considered to be Good  Factors which may influence rehabilitation potential include:   []         None noted  []         Mental ability/status  []         Medical condition  [x] Home/family situation and support systems  [x]         Safety awareness  []         Pain tolerance/management  []         Other:      PLAN :  Recommendations and Planned Interventions:   [x]           Bed Mobility Training             [x]    Neuromuscular Re-Education  [x]           Transfer Training                   []    Orthotic/Prosthetic Training  [x]           Gait Training                          []    Modalities  [x]           Therapeutic Exercises           []    Edema Management/Control  [x]           Therapeutic Activities            [x]    Family Training/Education  [x]           Patient Education  []           Other (comment):    Frequency/Duration: Patient will be followed by physical therapy 1-2 times per day/4-7 days per week to address goals. Further Equipment Recommendations for Discharge: bedside commode, shower chair, rolling walker, and N/A    AMPAC: Based on an AM-PAC score of 17/24 (**/20 if omitting stairs) and their current functional mobility deficits, it is recommended that the patient have 3-5 sessions per week of Physical Therapy at d/c to increase the patient's independence. This AMPAC score should be considered in conjunction with interdisciplinary team recommendations to determine the most appropriate discharge setting. Patient's social support, diagnosis, medical stability, and prior level of function should also be taken into consideration. SUBJECTIVE:   Patient stated I sometimes put my heel down .     OBJECTIVE DATA SUMMARY:   Hospital course since last seen and reason for re-evaluation: PAtient making slow progress towards goals and will continue with PT per POC. Past Medical History:   Diagnosis Date    Bipolar 2 disorder ([facility] [address]     Osteoarthritis      Past Surgical History:   Procedure Laterality Date    HX BREAST AUGMENTATION      HX HYSTERECTOMY       Barriers to Learning/Limitations: yes;     Compensate with: Visual Cues, Verbal Cues, and Tactile Cues  Home Situation:   Home Situation  Home Environment: Private residence  # Steps to Enter: 5  Rails to Enter: No  One/Two Story Residence: Two story  # of Interior Steps: 14  Living Alone: No  Support Systems: Friend/Neighbor (4 roomates)  Patient Expects to be Discharged to[de-identified] Skilled nursing facility  Current DME Used/Available at Home: Shower chair  Tub or Shower Type: Tub/Shower combination  Critical Behavior:  Neurologic State: Alert  Orientation Level: Oriented X4  Cognition: Follows commands        Skin Condition/Temp: Warm     Skin Integrity: Incision (comment)  Skin Integumentary  Skin Color: Appropriate for ethnicity  Skin Condition/Temp: Warm  Skin Integrity: Incision (comment)  Turgor: Non-tenting     Strength:    Strength: Generally decreased, functional                    Tone & Sensation:   Tone: Normal                              Range Of Motion:  AROM: Within functional limits                Functional Mobility:  Bed Mobility:     Supine to Sit: Supervision        Transfers:  Sit to Stand: Stand-by assistance (w/RW)           Bed to Chair: Contact guard assistance (w/RW)              Balance:   Sitting: Intact  Standing: Impaired; With support       Ambulation/Gait Training:  Distance (ft): 15 Feet (ft)  Assistive Device: Walker, rolling  Ambulation - Level of Assistance: Contact guard assistance;Stand-by assistance  Gait Abnormalities: Decreased step clearance           Pain:  Pain level pre-treatment: 0/10   Pain level post-treatment: 0/10   Pain Intervention(s) : Medication (see MAR); Rest, Ice, Repositioning   Response to intervention: Nurse notified, See doc flow    Activity Tolerance:   Fair  Please refer to the flowsheet for vital signs taken during this treatment.   After treatment:   []         Patient left in no apparent distress sitting up in chair  [x]         Patient left in no apparent distress in bed  [x]         Call bell left within reach  [x]         Nursing notified  [] Caregiver present  [x]         Bed alarm activated  []         SCDs applied    COMMUNICATION/EDUCATION:   [x]         Role of Physical Therapy in the acute care setting. [x]         Fall prevention education was provided and the patient/caregiver indicated understanding. [x]         Patient/family have participated as able in goal setting and plan of care. [x]         Patient/family agree to work toward stated goals and plan of care. []         Patient understands intent and goals of therapy, but is neutral about his/her participation. []         Patient is unable to participate in goal setting/plan of care: ongoing with therapy staff.  []         Other: Thank you for this referral.  Horace Radford, PT   Time Calculation: 12 mins    Lara Viveros AM-PAC® Basic Mobility Inpatient Short Form (6-Clicks) Version 2    How much HELP from another person does the patient currently need    (If the patient hasn't done an activity recently, how much help from another person do you think he/she would need if he/she tried?)   Total (Total A or Dep)   A Lot  (Mod to Max A)   A Little (Sup or Min A)   None (Mod I to I)   Turning from your back to your side while in a flat bed without using bedrails? [] 1 [] 2 [x] 3 [] 4   2. Moving from lying on your back to sitting on the side of a flat bed without using bedrails? [] 1 [] 2 [x] 3 [] 4   3. Moving to and from a bed to a chair (including a wheelchair)? [] 1 [] 2 [x] 3 [] 4   4. Standing up from a chair using your arms (e.g., wheelchair, or bedside chair)? [] 1 [] 2 [x] 3 [] 4   5. Walking in hospital room? [] 1 [] 2 [x] 3 [] 4   6. Climbing 3-5 steps with a railing?+   [] 1 [x] 2 [] 3 [] 4   +If stair climbing cannot be assessed, skip item #6. Sum responses from items 1-5.

## 2022-09-07 NOTE — PROGRESS NOTES
Bedside report given to Loli Márquez RN. Pt resting quietly in bed at this time, vital signs stable, call bell within reach.

## 2022-09-08 PROCEDURE — 74011250637 HC RX REV CODE- 250/637: Performed by: ORTHOPAEDIC SURGERY

## 2022-09-08 PROCEDURE — 97168 OT RE-EVAL EST PLAN CARE: CPT

## 2022-09-08 PROCEDURE — 97530 THERAPEUTIC ACTIVITIES: CPT

## 2022-09-08 PROCEDURE — 74011000250 HC RX REV CODE- 250: Performed by: ORTHOPAEDIC SURGERY

## 2022-09-08 PROCEDURE — 97116 GAIT TRAINING THERAPY: CPT

## 2022-09-08 PROCEDURE — 74011250637 HC RX REV CODE- 250/637: Performed by: INTERNAL MEDICINE

## 2022-09-08 PROCEDURE — 65270000029 HC RM PRIVATE

## 2022-09-08 PROCEDURE — 97535 SELF CARE MNGMENT TRAINING: CPT

## 2022-09-08 RX ORDER — UREA 10 %
2 LOTION (ML) TOPICAL 2 TIMES DAILY
Status: DISCONTINUED | OUTPATIENT
Start: 2022-09-08 | End: 2022-09-09 | Stop reason: HOSPADM

## 2022-09-08 RX ADMIN — OXYCODONE HYDROCHLORIDE 10 MG: 10 TABLET ORAL at 08:31

## 2022-09-08 RX ADMIN — OXYCODONE HYDROCHLORIDE 10 MG: 10 TABLET ORAL at 18:07

## 2022-09-08 RX ADMIN — CEFUROXIME AXETIL 500 MG: 250 TABLET, FILM COATED ORAL at 08:31

## 2022-09-08 RX ADMIN — QUETIAPINE FUMARATE 25 MG: 25 TABLET ORAL at 18:07

## 2022-09-08 RX ADMIN — QUETIAPINE FUMARATE 25 MG: 25 TABLET ORAL at 21:19

## 2022-09-08 RX ADMIN — POLYETHYLENE GLYCOL 3350 17 G: 17 POWDER, FOR SOLUTION ORAL at 08:32

## 2022-09-08 RX ADMIN — CEFUROXIME AXETIL 500 MG: 250 TABLET, FILM COATED ORAL at 21:19

## 2022-09-08 RX ADMIN — ALPRAZOLAM 1 MG: 0.5 TABLET ORAL at 10:29

## 2022-09-08 RX ADMIN — QUETIAPINE FUMARATE 25 MG: 25 TABLET ORAL at 08:31

## 2022-09-08 RX ADMIN — SODIUM CHLORIDE, PRESERVATIVE FREE 10 ML: 5 INJECTION INTRAVENOUS at 21:26

## 2022-09-08 RX ADMIN — ALPRAZOLAM 1 MG: 0.5 TABLET ORAL at 21:19

## 2022-09-08 RX ADMIN — SODIUM CHLORIDE, PRESERVATIVE FREE 10 ML: 5 INJECTION INTRAVENOUS at 05:57

## 2022-09-08 RX ADMIN — LAMOTRIGINE 25 MG: 25 TABLET ORAL at 08:31

## 2022-09-08 RX ADMIN — DIVALPROEX SODIUM 500 MG: 500 TABLET, EXTENDED RELEASE ORAL at 08:31

## 2022-09-08 RX ADMIN — DOXYCYCLINE HYCLATE 100 MG: 100 CAPSULE ORAL at 08:32

## 2022-09-08 RX ADMIN — DOXYCYCLINE HYCLATE 100 MG: 100 CAPSULE ORAL at 21:23

## 2022-09-08 NOTE — PROGRESS NOTES
Magalys Appl resting at this time. No changes to PE. Visit Vitals  BP (!) 147/89 (BP 1 Location: Left upper arm, BP Patient Position: Lying left side)   Pulse 83   Temp 97.7 °F (36.5 °C)   Resp 20   Ht 5' 4\" (1.626 m)   Wt 155 lb (70.3 kg)   SpO2 96%   Breastfeeding No   BMI 26.61 kg/m²        AP    SNF PENDING/  DC SUMMARY DONE/ MEDS SENT TO PHARMACY LAST WEEK. I CALLED FOR PEER TO PEER. ROSE BLANDON AT 11:30am    Miles Celaya MD  9/8/2022  5:14 PM

## 2022-09-08 NOTE — PROGRESS NOTES
I spoke with Dr Aidee Greene of [facility] [employer]   7  9/8/2022 11:26 AM.      Emma Arenas has beverley approved for SNF.       Margarita Rizvi MD  9/8/2022  11:31 AM

## 2022-09-08 NOTE — PROGRESS NOTES
Dirk Leon with [employer]	[place], [address] called [facility], Derrick Grater for SNF denied by [facility], Peer 2 Peer offered. Peer 2 Peer expires at 4 PM today. Phone number for Peer 2 Peer is [phone]. Need patient's name, , Policy # [id number], Reference # [id number]. Julian Tamez with above information and updated.                  Garrison Chawla RN  Case Management [phone]

## 2022-09-08 NOTE — PROGRESS NOTES
Dr. Bk Conrad called CM, said Peer 2 Peer completed, and auth approved for SNF, and [employer] to call SNF with auth approval information. Dr. Bk Conrad said patient will need medical transport for appt to see Dr. Bk Conrad. CM let Dr. Bk Conrad know that CM will let SNF know.              Tala Hanley RN  Case Management [phone]

## 2022-09-08 NOTE — ROUTINE PROCESS
Bedside and Verbal shift change report given to Sujit Hsieh RN (oncoming nurse) by JOEY Briceño RN (offgoing nurse). Report included the following information SBAR, Kardex, MAR, and Recent Results.

## 2022-09-08 NOTE — PROGRESS NOTES
Bedside  shift change report given to Tera Kennedy (oncoming nurse) by Omar Bro RN (offgoing nurse). Report included the following information SBAR, Lab results. MAR, pt with no acute distress.  Vs stable pain med with positive results

## 2022-09-08 NOTE — PROGRESS NOTES
Problem: Mobility Impaired (Adult and Pediatric)  Goal: *Acute Goals and Plan of Care (Insert Text)  Description: Physical Therapy Goals  Initiated 9/1/2022 and to be accomplished within 7 day(s)  1. Patient will move from supine to sit and sit to supine in bed with modified independence. 2.  Patient will transfer from bed to chair and chair to bed with modified independence using the least restrictive device. 3.  Patient will perform sit to stand with modified independence. 4.  Patient will ambulate with modified independence for 50 feet with the least restrictive device. 5.  Patient will ascend/descend 3 stairs with handrail(s) with minimal assistance/contact guard assist.    PLOF: Independent. Lives in boarding house on 2nd floor. 4 steps to enter; no handrails. Outcome: Progressing Towards Goal     PHYSICAL THERAPY TREATMENT    Patient: Kingston Fam (64 y.o. female)  Date: 9/8/2022  Diagnosis: Open fracture of distal phalanx of right great toe [S92.421B] Open fracture of distal phalanx of right great toe  Procedure(s) (LRB):  INCISION AND DRAINAGE LOWER EXTREMITY AND ORIF OF RIGHT GREAT TOE (Right) 8 Days Post-Op  Precautions: Fall, NWB, Skin  PLOF: see above     ASSESSMENT:  Pt received in bed in NAD. Pt aware of WBing however still wanting to put heel down, ensured her of MD orders of NWB for now and she was able to maintain during session. Pt progressed to approx 25 ft of gait with the RW this date with CGA at slow speed, no LOB noted. Pt then positioned for comfort up in recliner with all needs met. Pt becoming emotionally labile and tearful about her current mobility status and need for RW as she feels \"hideous\" with one. Discussed option of possible knee scooter for her and she declined this, reporting she wants crutches because they are less \"dorky\" however pt educated on RW being most safe option for her.  Pt given encouragement to think positive about her progress thus far and going to continue in the rehab process when she discharges. Pt left in bed with all needs met and call bell within reach. Will continue to follow per POC. Progression toward goals:   [x]      Improving appropriately and progressing toward goals  []      Improving slowly and progressing toward goals  []      Not making progress toward goals and plan of care will be adjusted     PLAN:  Patient continues to benefit from skilled intervention to address the above impairments. Continue treatment per established plan of care. Further Equipment Recommendations for Discharge:  RW     AMPAC: Based on an AM-PAC score of 17/24 (**/20 if omitting stairs) and their current functional mobility deficits, it is recommended that the patient have 3-5 sessions per week of Physical Therapy at d/c to increase the patient's independence. This AMPAC score should be considered in conjunction with interdisciplinary team recommendations to determine the most appropriate discharge setting. Patient's social support, diagnosis, medical stability, and prior level of function should also be taken into consideration. SUBJECTIVE:   Patient stated I look hideous with a walker, they are atrocious.     OBJECTIVE DATA SUMMARY:   Critical Behavior:  Neurologic State: Alert  Orientation Level: Oriented X4  Cognition: Follows commands  Safety/Judgement: Fall prevention  Functional Mobility Training:  Bed Mobility:     Supine to Sit: Supervision     Scooting: Modified independent         Transfers:  Sit to Stand: Stand-by assistance  Stand to Sit: Stand-by assistance        Bed to Chair: Stand-by assistance    Balance:  Sitting: Intact  Standing: Impaired; With support    Ambulation/Gait Training:  Distance (ft): 25 Feet (ft)  Assistive Device: Walker, rolling  Ambulation - Level of Assistance: Contact guard assistance        Gait Abnormalities: Decreased step clearance  Right Side Weight Bearing: Non-weight bearing           Speed/Petra: Slow  Step Length: Left shortened        Pain:  Pain level pre-treatment: 0/10  Pain level post-treatment: 0/10   Pain Intervention(s): Medication (see MAR); Rest, Ice, Repositioning   Response to intervention: Nurse notified    Activity Tolerance:   Good    Please refer to the flowsheet for vital signs taken during this treatment. After treatment:   [x] Patient left in no apparent distress sitting up in chair  [] Patient left in no apparent distress in bed  [x] Call bell left within reach  [x] Nursing notified  [] Caregiver present  [x] Bed alarm activated- chair  [] SCDs applied      COMMUNICATION/EDUCATION:   [x]         Role of Physical Therapy in the acute care setting. [x]         Fall prevention education was provided and the patient/caregiver indicated understanding. [x]         Patient/family have participated as able in working toward goals and plan of care. [x]         Patient/family agree to work toward stated goals and plan of care. []         Patient understands intent and goals of therapy, but is neutral about his/her participation. []         Patient is unable to participate in stated goals/plan of care: ongoing with therapy staff.  []         Other:        Hayder Randall   Time Calculation: 23 mins    Maribel Sanabria AM-PAC® Basic Mobility Inpatient Short Form (6-Clicks) Version 2    How much HELP from another person does the patient currently need    (If the patient hasn't done an activity recently, how much help from another person do you think he/she would need if he/she tried?)   Total (Total A or Dep)   A Lot  (Mod to Max A)   A Little (Sup or Min A)   None (Mod I to I)   Turning from your back to your side while in a flat bed without using bedrails? [] 1 [] 2 [x] 3 [] 4   2. Moving from lying on your back to sitting on the side of a flat bed without using bedrails? [] 1 [] 2 [x] 3 [] 4   3. Moving to and from a bed to a chair (including a wheelchair)? [] 1 [] 2 [x] 3 [] 4   4. Standing up from a chair using your arms (e.g., wheelchair, or bedside chair)? [] 1 [] 2 [] 3 [] 4   5. Walking in hospital room? [] 1 [] 2 [x] 3 [] 4   6. Climbing 3-5 steps with a railing?+   [] 1 [x] 2 [] 3 [] 4   +If stair climbing cannot be assessed, skip item #6. Sum responses from items 1-5. Based on an AM-PAC score of 17/24 (**/20 if omitting stairs) and their current functional mobility deficits, it is recommended that the patient have 3-5 sessions per week of Physical Therapy at d/c to increase the patient's independence.

## 2022-09-08 NOTE — PROGRESS NOTES
Infectious Disease progress Note        Reason: Open fracture right great toe, antibiotic recommendations    Current abx Prior abx     Cefuroxime, doxycycline since 9/3/2022 Ceftriaxone, vancomycin since 8/31/2022-9/2     Lines:       Assessment :   74 y.o. female with no significant past medical history presented emergency room on 8/31/2022 with right great toe pain status post injury. Clinical presentation consistent with open fracture to the right great toe first IP region and to the nail proximal nail plate interface, with nailbed and nail plate injury s/p nailbed repair, open reduction internal fixation of the right great toe distal phalanx on 8/31/22    Almost completely resolved erythema right great toe on today's exam.  No drainage noted. Management complicated due to anxiety, lack of adequate social support at home    Recommendations:    Recommend po cefuroxime 500 mg po bid, doxycycline 100 mg po bid till 9/12/22    d/c planning per primary team    Above plan was discussed in details with patient. Please call me if any further questions or concerns. Will continue to participate in the care of this patient. HPI:    Feels better. Denies any worsening pain right great toe. Is happy that right great toe redness is improving. Has questions about her discharge    Past Medical History:   Diagnosis Date    Bipolar 2 disorder ([address]     Osteoarthritis        Past Surgical History:   Procedure Laterality Date    HX BREAST AUGMENTATION      HX HYSTERECTOMY         Home Medication List      Details   QUEtiapine (SEROquel) 25 mg tablet Take 25 mg by mouth three (3) times daily. divalproex ER (DEPAKOTE ER) 250 mg ER tablet TAKE 3 TABS BEDTIME FOR MOOD,BIPOLAR DISORD,CRNT EPISODE MIXED,SEVERE,W PSYCH FEATURES      lamoTRIgine (LaMICtal) 25 mg tablet       ALPRAZolam (XANAX) 1 mg tablet Take 1 mg by mouth two (2) times a day.       DULoxetine (CYMBALTA) 60 mg capsule TAKE 1 CAP ONCE A DAY FOR DEPRESSION Current Facility-Administered Medications   Medication Dose Route Frequency    Lactobacillus Acidoph & Bulgar [facility]) tablet 2 Tablet  2 Tablet Oral BID    divalproex ER (DEPAKOTE ER) 24 hour tablet 500 mg  500 mg Oral DAILY    polyethylene glycol (MIRALAX) packet 17 g  17 g Oral DAILY    cefUROXime (CEFTIN) tablet 500 mg  500 mg Oral Q12H    doxycycline (VIBRAMYCIN) capsule 100 mg  100 mg Oral Q12H    ondansetron (ZOFRAN) injection 4 mg  4 mg IntraVENous Q6H PRN    ALPRAZolam (XANAX) tablet 1 mg  1 mg Oral BID    lamoTRIgine (LaMICtal) tablet 25 mg  25 mg Oral DAILY    QUEtiapine (SEROquel) tablet 25 mg  25 mg Oral TID    sodium chloride (NS) flush 5-40 mL  5-40 mL IntraVENous Q8H    sodium chloride (NS) flush 5-40 mL  5-40 mL IntraVENous PRN    naloxone (NARCAN) injection 0.4 mg  0.4 mg IntraVENous PRN    oxyCODONE-acetaminophen (PERCOCET 7.5) 7.5-325 mg per tablet 1 Tablet  1 Tablet Oral Q4H PRN    oxyCODONE IR (ROXICODONE) tablet 10 mg  10 mg Oral Q4H PRN    HYDROmorphone (DILAUDID) injection 1 mg  1 mg IntraVENous Q4H PRN       Allergies: Patient has no known allergies. History reviewed. No pertinent family history.   Social History     Socioeconomic History    Marital status:      Spouse name: Not on file    Number of children: Not on file    Years of education: Not on file    Highest education level: Not on file   Occupational History    Not on file   Tobacco Use    Smoking status: Never    Smokeless tobacco: Never   Substance and Sexual Activity    Alcohol use: Yes     Comment: rarely    Drug use: Never    Sexual activity: Not on file   Other Topics Concern    Not on file   Social History Narrative    Not on file     Social Determinants of Health     Financial Resource Strain: Not on file   Food Insecurity: Not on file   Transportation Needs: Not on file   Physical Activity: Not on file   Stress: Not on file   Social Connections: Not on file   Intimate Partner Violence: Not on file Housing Stability: Not on file     Social History     Tobacco Use   Smoking Status Never   Smokeless Tobacco Never        Temp (24hrs), Av.7 °F (36.5 °C), Min:97.3 °F (36.3 °C), Max:98 °F (36.7 °C)    Visit Vitals  BP (!) 144/78 (BP 1 Location: Left upper arm, BP Patient Position: Sitting)   Pulse 80   Temp 97.5 °F (36.4 °C)   Resp 20   Ht 5' 4\" (1.626 m)   Wt 70.3 kg (155 lb)   SpO2 98%   Breastfeeding No   BMI 26.61 kg/m²       ROS: Unable to obtain due to patient factors    Physical Exam:    General: Well developed, well nourished female sitting on the  bed AAOx3 in no acute distress. General:   awake alert and oriented   HEENT:  Normocephalic, atraumatic, , EOMI, no scleral icterus or pallor; no conjunctival hemmohage;  nasal and oral mucous are moist and without evidence of lesions. . Neck supple, no bruits. Lymph Nodes:   Not examined   Lungs:   non-labored, bilateral chest movements equal, no audible wheezing   Heart:  RRR, s1 and s2;  no edema   Abdomen:  soft, non-distended, active bowel sounds, no hepatomegaly, no splenomegaly. . Non-tender   Genitourinary:  deferred   Extremities:   no clubbing, cyanosis; no joint effusions or swelling; Full ROM of all large joints to the upper and lower extremities; muscle mass appropriate for age; decreased erythema right great toe with pin in place-no increased warmth/erythema/tenderness right leg   Neurologic:  No gross focal sensory or motor abnormalities; . Cranial nerves intact                        Skin:  No rash or ulcers noted   Psychiatric:  anxious         Labs: Results:   Chemistry No results for input(s): GLU, NA, K, CL, CO2, BUN, CREA, CA, AGAP, BUCR, TBIL, AP, TP, ALB, GLOB, AGRAT in the last 72 hours. No lab exists for component: GPT     CBC w/Diff No results for input(s): WBC, RBC, HGB, HCT, PLT, GRANS, LYMPH, EOS, HGBEXT, HCTEXT, PLTEXT, HGBEXT, HCTEXT, PLTEXT in the last 72 hours.      Microbiology No results for input(s): CULT in the last 72 hours. RADIOLOGY:    All available imaging studies/reports in [facility] for this admission were reviewed      Disclaimer: Sections of this note are dictated utilizing voice recognition software, which may have resulted in some phonetic based errors in grammar and contents. Even though attempts were made to correct all the mistakes, some may have been missed, and remained in the body of the document. If questions arise, please contact our department.     Dr. Kiana Early, Infectious Disease Specialist  [phone]  September 8, 2022  12:21 PM

## 2022-09-08 NOTE — PROGRESS NOTES
NASRIN spoke with Rhoda Gonzalez with [address] [place] [address], [address] [place] [address], updated her that Dr. Jason Abel said [facility] was approved for SNF, and [employer] to contact SNF with update. Rhoda Gonzalez said they have not heard anything yet about approval, and will contact CM and update once notified and approval paperwork needed is received.                Dontrell Olivas RN  Case Management [phone]

## 2022-09-08 NOTE — PROGRESS NOTES
Sylvia with [employer] called CM, said she would send or fax paperwork, or have someone else send to [facility] that Brianda Winston was approved, but then said [facility] doctor still had Brianda Alfonsoing in pending status. Polojerrod Stocke said she would contact [facility] doctor to update auth status for [facility], and update CM.               Oskar Buck, RN  Case Management [phone]

## 2022-09-08 NOTE — PROGRESS NOTES
Comprehensive Nutrition Assessment    Type and Reason for Visit: Initial, RD nutrition re-screen/LOS    Nutrition Recommendations/Plan:   No nutrition intervention indicated at this time. Will re-screen as appropriate. Malnutrition Assessment:  Malnutrition Status:  No malnutrition (09/08/22 1714)      Nutrition History and Allergies:   Past medical hx:  bipolar 2 disorder, osteoarthritis. Wt trends PTA per chart hx:  150 lb on 10/6/2021;   155 lb upon admission. Stable weight. No known food allergies     Nutrition Assessment:    Pt unavailable at time of visit. Is on regular diet. Has good po intake most meals per chart documentation. Tolerating diet. Admitted due to having tripped and hitting distal end of right great toe on concrete step causing an open fracture. S/p surgery on 8/31    Nutrition Related Findings:    BM 9/7,  9/6. No edema. Pertinent meds: pain medication, bowel regimen, floranex, antibiotic. Wound Type: Surgical incision    Current Nutrition Intake & Therapies:  Average Meal Intake: %  Average Supplement Intake: None ordered  ADULT DIET Regular    Anthropometric Measures:  Height: 5' 4\" (162.6 cm)  Ideal Body Weight (IBW): 120 lbs (55 kg)  Admission Body Weight: 154 lb 15.7 oz  Current Body Wt:  70.3 kg (154 lb 15.7 oz), 129.2 % IBW. Stated  Current BMI (kg/m2): 26.6  Usual Body Weight: 68 kg (150 lb)  % Weight Change (Calculated): 3.3  Weight Adjustment: No adjustment  BMI Category: Overweight (BMI 25.0-29. 9)    Estimated Daily Nutrient Needs:  Energy Requirements Based On: Formula  Weight Used for Energy Requirements: Admission  Energy (kcal/day): 9592-4219  Weight Used for Protein Requirements: Admission  Protein (g/day): 56-84  Method Used for Fluid Requirements: 1 ml/kcal  Fluid (ml/day): 9781-1401    Nutrition Diagnosis:   No nutrition diagnosis at this time       Nutrition Interventions:   Food and/or Nutrient Delivery: Continue current diet  Nutrition Education/Counseling: Education not indicated  Coordination of Nutrition Care: Continue to monitor while inpatient       Goals:     Goals: Meet at least 75% of estimated needs, by next RD assessment       Nutrition Monitoring and Evaluation:   Behavioral-Environmental Outcomes: None identified  Food/Nutrient Intake Outcomes: Food and nutrient intake  Physical Signs/Symptoms Outcomes: Biochemical data, Meal time behavior, Skin    Discharge Planning:    No discharge needs at this time    Osmar Corey, [address]: [phone]

## 2022-09-09 VITALS
DIASTOLIC BLOOD PRESSURE: 93 MMHG | TEMPERATURE: 98.4 F | BODY MASS INDEX: 26.46 KG/M2 | SYSTOLIC BLOOD PRESSURE: 168 MMHG | OXYGEN SATURATION: 96 % | HEART RATE: 100 BPM | HEIGHT: 64 IN | WEIGHT: 155 LBS | RESPIRATION RATE: 16 BRPM

## 2022-09-09 PROCEDURE — 2709999900 HC NON-CHARGEABLE SUPPLY

## 2022-09-09 PROCEDURE — 74011000250 HC RX REV CODE- 250: Performed by: ORTHOPAEDIC SURGERY

## 2022-09-09 PROCEDURE — 77030038269 HC DRN EXT URIN PURWCK BARD -A

## 2022-09-09 PROCEDURE — 74011250637 HC RX REV CODE- 250/637: Performed by: INTERNAL MEDICINE

## 2022-09-09 PROCEDURE — 74011250637 HC RX REV CODE- 250/637: Performed by: ORTHOPAEDIC SURGERY

## 2022-09-09 RX ADMIN — OXYCODONE HYDROCHLORIDE 10 MG: 10 TABLET ORAL at 10:50

## 2022-09-09 RX ADMIN — OXYCODONE HYDROCHLORIDE 10 MG: 10 TABLET ORAL at 17:18

## 2022-09-09 RX ADMIN — ALPRAZOLAM 1 MG: 0.5 TABLET ORAL at 09:33

## 2022-09-09 RX ADMIN — DOXYCYCLINE HYCLATE 100 MG: 100 CAPSULE ORAL at 09:33

## 2022-09-09 RX ADMIN — DIVALPROEX SODIUM 500 MG: 500 TABLET, EXTENDED RELEASE ORAL at 09:33

## 2022-09-09 RX ADMIN — QUETIAPINE FUMARATE 25 MG: 25 TABLET ORAL at 17:00

## 2022-09-09 RX ADMIN — CEFUROXIME AXETIL 500 MG: 250 TABLET, FILM COATED ORAL at 09:34

## 2022-09-09 RX ADMIN — SODIUM CHLORIDE, PRESERVATIVE FREE 5 ML: 5 INJECTION INTRAVENOUS at 14:21

## 2022-09-09 RX ADMIN — POLYETHYLENE GLYCOL 3350 17 G: 17 POWDER, FOR SOLUTION ORAL at 09:33

## 2022-09-09 RX ADMIN — LAMOTRIGINE 25 MG: 25 TABLET ORAL at 09:33

## 2022-09-09 RX ADMIN — QUETIAPINE FUMARATE 25 MG: 25 TABLET ORAL at 09:34

## 2022-09-09 NOTE — PROGRESS NOTES
NASRIN spoke with Dee Casas with [facility], [address] [place] [address] Rd, said yesterday at 5:15 auth for patient still pending with [facility]. NASRIN let Dee Casas know that NASRIN will follow up with [facility] with [facility]. NASRIN called Sylvia with [facility], received voicemail, CM left a message checking on auth status with Dr  at [facility], was verbally approved for [facility] done by Dr. Tiffanie Casas. CM left phone number for a return call.              Konrad Cid, RN  Case Management [phone]

## 2022-09-09 NOTE — PROGRESS NOTES
Call placed to [facility] to give nurse report and spoke to Roger and exch info about pt and made aware.

## 2022-09-09 NOTE — PROGRESS NOTES
Problem: Self Care Deficits Care Plan (Adult)  Goal: *Acute Goals and Plan of Care (Insert Text)  Description: Occupational Therapy Goals  Initiated 9/1/2022, re-evaluated on 9/8/2022 within 7 day(s). Continue all previously set goals until met consistently. 1.  Patient will perform grooming with modified independence. 2.  Patient will perform bathing with modified independence. 3.  Patient will perform upper body dressing and lower body dressing with modified independence. 4.  Patient will perform toilet transfers with modified independence using RW while maintaining RLE NWB. 5.  Patient will perform all aspects of toileting with modified independence. 6.  Patient will participate in upper extremity therapeutic exercise/activities with modified independence for 8 minutes. 7.  Patient will utilize energy conservation techniques during functional activities with min verbal cues. Prior Level of Function: independent with ADLs and functional mobility w/o AD     Outcome: Progressing Towards Goal   OCCUPATIONAL THERAPY RE-EVALUATION    Patient: Oni Longo (1 y.o. female)  Date: 9/8/2022  Primary Diagnosis: Open fracture of distal phalanx of right great toe [S92.421B]  Procedure(s) (LRB):  INCISION AND DRAINAGE LOWER EXTREMITY AND ORIF OF RIGHT GREAT TOE (Right) 8 Days Post-Op   Precautions:   Fall, NWB, Skin    ASSESSMENT :  Based on the objective data described below, the patient presents with decreased ADLs, decreased functional mobility and muscle weakness, complicated by NWB status on her RLE. Patient emotionally labile throughout session, complaining at times about the hospital food, the people she lives with and not being safe to return to her home. Supervision given for LB self care and functional transfers using a RW. NWB status maintained during short distances observed in session.   She was left seated in the recliner at the end of the session with all needs met and lunch on tray in front of her. Chair alarm engaged. Patient will benefit from skilled intervention to address the above impairments. Patient's rehabilitation potential is considered to be Good  Factors which may influence rehabilitation potential include:   []             None noted  [x]             Mental ability/status  [x]             Medical condition  []             Home/family situation and support systems  []             Safety awareness  []             Pain tolerance/management  []             Other:      PLAN :  Recommendations and Planned Interventions:   [x]               Self Care Training                  [x]      Therapeutic Activities  [x]               Functional Mobility Training   []      Cognitive Retraining  [x]               Therapeutic Exercises           [x]      Endurance Activities  [x]               Balance Training                    []      Neuromuscular Re-Education  []               Visual/Perceptual Training     [x]      Home Safety Training  [x]               Patient Education                   [x]      Family Training/Education  []               Other (comment):    Frequency/Duration: Patient will be followed by occupational therapy 1-2 times per day/3-5 days per week to address goals. Further Equipment Recommendations for Discharge: bedside commode and rolling walker; patient states she has a shower chair    AMPAC: Based on an AM-PAC score of 19/24 and their current ADL deficits; it is recommended that the patient have 2-3 sessions per week of Occupational Therapy at d/c to increase the patient's independence. Despite AMPAC score, patient would benefit from further rehab placement to address mobilization with NWB status and IADL training for home safety. This AMPAC score should be considered in conjunction with interdisciplinary team recommendations to determine the most appropriate discharge setting.  Patient's social support, diagnosis, medical stability, and prior level of function should also be taken into consideration. SUBJECTIVE:   Patient stated I can't get up all those steps at home and no one will help me there.     OBJECTIVE DATA SUMMARY:   Hospital course since last seen and reason for reevaluation: Patient making steady progress toward goals but continues to benefit from skilled OT to address higher level functional tasks. Past Medical History:   Diagnosis Date    Bipolar 2 disorder ([facility] [address]     Osteoarthritis      Past Surgical History:   Procedure Laterality Date    HX BREAST AUGMENTATION      HX HYSTERECTOMY       Barriers to Learning/Limitations: yes;  emotional  Compensate with: visual, verbal, tactile, kinesthetic cues/model    Home Situation:   Home Situation  Home Environment: Private residence  # Steps to Enter: 5  Rails to Enter: No  One/Two Story Residence: Two story  # of Interior Steps: 14  Living Alone: No  Support Systems: Friend/Neighbor (4 roomates)  Patient Expects to be Discharged to[de-identified] Skilled nursing facility  Current DME Used/Available at Home: Shower chair  Tub or Shower Type: Tub/Shower combination  [x]  Right hand dominant   []  Left hand dominant    Cognitive/Behavioral Status:  Neurologic State: Alert  Orientation Level: Oriented X4  Cognition: Follows commands  Safety/Judgement: Fall prevention    Skin: Intact on UEs  Edema: None noted in UEs    Vision/Perceptual:     Acuity: Within Defined Limits    Corrective Lenses: Glasses    Coordination: BUE  Fine Motor Skills-Upper: Left Intact; Right Intact    Gross Motor Skills-Upper: Left Intact; Right Intact    Balance:  Sitting: Intact  Standing: Impaired; With support    Strength: BUE  Strength: Generally decreased, functional (4/5 throughout)    Tone & Sensation: BUE  Tone: Normal  Sensation: Intact    Range of Motion: BUE  AROM: Within functional limits    Functional Mobility and Transfers for ADLs:  Bed Mobility:  Supine to Sit: Supervision  Scooting: Modified independent  Transfers:  Sit to Stand: Stand-by assistance  Stand to Sit: Stand-by assistance  Bed to Chair: Stand-by assistance    ADL Assessment:   Feeding: Modified independent    Oral Facial Hygiene/Grooming: Setup;Supervision    Bathing: Supervision    Upper Body Dressing: Modified independent    Lower Body Dressing: Supervision    Toileting: Supervision    ADL Intervention:  Patient practiced LB dressing with left sock and simulated pants/underwear while seated on edge of chair and in standing. Extra time needed and RW for stability in standing but no physical assist given. No LOB noted and she was able to maintain NWB status on her RLE. Cognitive Retraining  Safety/Judgement: Fall prevention    Pain:  Pain level pre-treatment: 5/10, everywhere except for her LEs   Pain level post-treatment: 5/10, everywhere except for her LEs  Pain Intervention(s): Medication (see MAR); Rest, Repositioning   Response to intervention: Nurse notified, See doc flow    Activity Tolerance:   Good  Please refer to the flowsheet for vital signs taken during this treatment. After treatment:   [x] Patient left in no apparent distress sitting up in chair  [] Patient left in no apparent distress in bed  [x] Call bell left within reach  [x] Nursing notified  [] Caregiver present  [x] Chair alarm activated    COMMUNICATION/EDUCATION:   [x] Role of Occupational Therapy in the acute care setting  [x] Home safety education was provided and the patient/caregiver indicated understanding. [x] Patient/family have participated as able in goal setting and plan of care. [x] Patient/family agree to work toward stated goals and plan of care. [] Patient understands intent and goals of therapy, but is neutral about his/her participation. [] Patient is unable to participate in goal setting and plan of care.     Thank you for this referral.  Jennifer Mayer MS OTR/L   Time Calculation: 17 mins    [employer] AM-PAC® Daily Activity Inpatient Short Form (6-Clicks)*    How much HELP from another person does the patient currently need    (If the patient hasn't done an activity recently, how much help from another person do you think he/she would need if he/she tried?)   Total (Total A or Dep)   A Lot  (Mod to Max A)   A Little (Sup or Min A)   None (Mod I to I)   Putting on and taking off regular lower body clothing? [] 1 [] 2 [x] 3 [] 4   2. Bathing (including washing, rinsing,      drying)? [] 1 [] 2 [x] 3 [] 4   3. Toileting, which includes using toilet, bedpan or urinal?   [] 1 [] 2 [x] 3 [] 4   4. Putting on and taking off regular upper body clothing? [] 1 [] 2 [x] 3 [] 4   5. Taking care of personal grooming such as brushing teeth? [] 1 [] 2 [x] 3 [] 4   6. Eating meals? [] 1 [] 2 [] 3 [x] 4     Based on an AM-PAC score of 19/24 and their current ADL deficits; it is recommended that the patient have 2-3 sessions per week of Occupational Therapy at d/c to increase the patient's independence.

## 2022-09-09 NOTE — PROGRESS NOTES
Requested Case Management specialist to assist with transportation to: [facility]      Address is:      [address]         and phone number is:    [phone]      Patient will require BLS transport. Pt requires Stretcher If stretcher, reason: Status Post ORIF Right lower extremity, Bipolar Disorder, Impaired Mobility  Patient is currently requiring oxygen No   Height: 5\"4   Weight: 155 lbs  Pt is on isolation: No    Is the pt ready now? no  Requested time:  Today 4 PM  PCS Faxed: yes  Insurance verified on face sheet: yes  Auth needed for transport: no  CM completed PCS/ Envelope and placed on chart.

## 2022-09-09 NOTE — PROGRESS NOTES
NASRIN spoke with Yasmeen Leyva with [facility]	[place], [address], they received auth from [employer], and they can take patient today. NASRIN spoke with patient, and updated her on discharge plan for SNF [facility] today. Patient is agreeable to the discharge plan for today. Odilon See Dr. Khadar Trinidad and updated with discharge plan for today, and asked if Discharge Summary can be updated.               Roel Smith, RN  Case Management [phone]

## 2022-09-09 NOTE — PROGRESS NOTES
Per Melo Jenkins (NASRIN) [facility] Olu Leary transport is scheduled for 5:30 pm to [address] [place] [address] ([address]). Informed Melo Jenkins of transportation conversation and arrangements.

## 2022-09-09 NOTE — PROGRESS NOTES
Dr. Aundrea García that she still does not have access to sign UAI/LTSS, and to let Poornima Lozano know. NASRIN Perfect Served Poornima Lozano and updated. NASRIN spoke with Rhys Manriquez with [facility]	[place], [address] [place] [address] Rd, updated her that [facility] scheduled for 5:30 PM.   NASRIN updated Rhys Manriquez that UAI/LTSS was done, but needs to be approved and signed by , and NASRIN will [facility] to [facility] if this is completed before CM leaves today, if not to please contact NASRIN on call this weekend for approval and signature of UAI/LTSS.              Jil Ch, RN  Case Management [phone]

## 2022-09-09 NOTE — PROGRESS NOTES
TRANSPORT ARRIVED TO  PT. INFORMATION EXCHANGED ABOUT PT. IV REMOVED. NO BLEEDING, DRAINAGE AND INFILTRATION NOTED. REQ. DOCS GIVEN TO TRANSPORT. OBSERED PT ON STRETCHER VIA STRAPS SECURED. PT ESCORTED OFF UNIT VIA STRETCHER WITH TRANSPORT. sensory intact

## 2022-09-09 NOTE — PROGRESS NOTES
NASRIN completed UAI/LTSS on [place] Medicaid Website, status is Successfully Submitted, tracking # [id number]. Ishmael Do Dr. Joaquin Landau consult for Dr. Bird Lama to review and approve UAI/LTSS needed for SN[facility] [address] [facility].                Pollo Mercado, NICOLE  Case Management [phone]

## 2022-09-09 NOTE — PROGRESS NOTES
Discharge order noted for today. Patient has been accepted to [address] [place] [address] nursing [facility]. Confirmed with Sarah Infante that bed is available today. Met with patient and she is agreeable to the transition plan today. [facility] authorization has been obtained. ** Transport to facility has been arranged through [employer] at 5:30 PM time. Patient's discharge summary has been forwarded to skilled nursing facility via [address] and was placed in Transport Envelope to go with patient to SNF. Bedside RN, Natalie Mccabe, has been updated to the transition plan. Discharge information has been updated on the AVS.  Please call report to [phone].             Laura Mar RN  Case Management [phone]

## 2022-09-09 NOTE — PROGRESS NOTES
Bedside and Verbal shift change report given to Hamlet Ortega (oncoming nurse) by Nestor Hagan (offgoing nurse). Report included the following information SBAR, Kardex, MAR, and Recent Results.

## 2022-09-09 NOTE — PROGRESS NOTES
Dr. Sola Granados called , said Dr. Gamal Taveras will be seeing patient and reviewing UAI for approval.         Prudence Taveras to please review and sign UAI/LTSS for SNF.              Felicia Chavez RN  Case Management [phone]

## 2022-09-09 NOTE — PROGRESS NOTES
Transport Envelope placed on front of patient's chart with PCS form, 2 facesheets, and Discharge Summary. NASRIN updated Ryland Gross on discharge plan for today.                Sp Romero RN  Case Management [phone]

## 2022-09-09 NOTE — PROGRESS NOTES
Transition of Care Plan to SNF/Rehab    SNF/Rehab Transition:  Patient has been accepted to [address] [place] [address] and meets criteria for admission. Patient will transported by [facility] and expected to leave at 5 PM.    Communication to Patient/Family:  Met with patient and she is agreeable to the transition plan. Communication to SNF/Rehab:  Bedside RN, Roseann Del Cid, has been notified to update the transition plan to the facility and call report (phone number [phone]). Discharge information has been updated on the AVS.     Discharge Summary is available to SNF via [address] [facility], and was placed in Transport Envelope to go with patient to SNF. Nursing Please include all hard scripts for controlled substances, med rec and dc summary, and AVS in packet. Reviewed and confirmed with facility, [address] [place] [address], can manage the patient care needs for the following:     Arvin Aguilar with (X) only those applicable:    Medication:  [x]  Medications will be available at the facility  []  IV Antibiotics   []  Controlled Substance - hard copy to be sent with patient   []  Weekly Labs   Documents:  [] Hard RX  [] MAR  [] Kardex  [] AVS  []Transfer Summary  [x]Discharge Summary   Equipment:  []  CPAP/BiPAP  []  Wound Vacuum  []  Alfaro or Urinary Device  []  PICC/Central Line  []  Nebulizer  []  Ventilator   Treatment:  []Isolation (for MRSA, VRE, etc.)  []Surgical Drain Management  []Tracheostomy Care  []Dressing Changes  []Dialysis with transportation and chair time. []PEG Care  []Oxygen  []Daily Weights for Heart Failure   Dietary:  []Any diet limitations  []Tube Feedings   []Total Parenteral Management (TPN)   Eligible for Medicaid Long Term Services and Supports  Yes:  [] Eligible for medical assistance or will become eligible within 180 days and UAI completed. [] Provider/Patient and/or support system has requested screening. [] UAI copy provided to patient or responsible party.   [x] UAI completed, but needs to be approved and signed by Doctor. [] UAI unavailable at discharged mailed to patient  No:   [] Private pay and is not financially eligible for Medicaid within the next 180 days. [] Reside out-of-state.   [] A residents of a state owned/operated facility that is licensed  by [facility] and [facility], [facility] or [facility]  [] Enrollment in [facility] hospice services  []  Medical [address]  [] Patient /Family declines to have screening completed or provide financial information for screening     Financial Resources:  Medicaid    [] Initiated and application pending   [x] Full coverage     Advanced Care Plan:  []Surrogate Decision Maker of Care  []POA  [x]Communicated Code Status Full   Other

## 2022-09-14 NOTE — PROGRESS NOTES
[address],[place] [address]  Phone: [phone]  Fax: [phone]    Carmen Muro DO        August 1, 2022     Patient: Lara Doherty   YOB: 2007   Date of Visit: 8/1/2022       To Whom it May Concern:    Lara Doherty was seen in my clinic on 8/1/2022, please excuse her absence from work today. If you have any questions or concerns, please don't hesitate to call.     Sincerely,         Carmen Muro DO Late Entry 09/14/2022 3:10 pm      Yulisa with [address] called CM asking about UAI/LTSS and if a doctor was able to sign, said she needs UAI/LTSS. Patient discharged last Friday, CM updated Amina Perez that hospital was consulted to sign for UAI/LTSS, Dr. Lor Samson informed CM that Dr. Kwasi Walters was going to sign, but Dr. Kwasi Walters said she still did not have access to sign UAI/LTSS, and Leonarda Lira was informed. CM let Amina Perez know that CM will ask if one of the hospitalists will sign UAI/LTSS. CM called Hospitalists, received voicemail, updated with patient name and mrn#, and asking for help with approval of UAI/LTSS for SNF. CM spoke with Rosi Daily and updated, Choco Trujillo said she will reach out to Dr. Lor Samson to see if he can sign UAI/LTSS for patient.            Oskar Buck, RN  Case Management [phone]

## 2022-09-15 NOTE — PROGRESS NOTES
Late Entry 09/15/2022 12:00 pm       Dr. Lulú Acosta signed UAI/LTSS, Status is Accepted-Authorized, tracking # [id number]. CM uploaded UAI/LTSS to [facility] for SNF [address] [facility]. NASRIN spoke with [facility] with [address] and updated.                Kalani Costa RN  Case Management [phone]

## 2022-09-28 ENCOUNTER — OFFICE VISIT (OUTPATIENT)
Dept: ORTHOPEDIC SURGERY | Age: 67
End: 2022-09-28
Payer: MEDICARE

## 2022-09-28 VITALS — OXYGEN SATURATION: 100 % | BODY MASS INDEX: 25.92 KG/M2 | HEART RATE: 71 BPM | TEMPERATURE: 97.1 F | WEIGHT: 151 LBS

## 2022-09-28 DIAGNOSIS — Z98.890 POST-OPERATIVE STATE: Primary | ICD-10-CM

## 2022-09-28 DIAGNOSIS — Z98.890 HISTORY OF REMOVAL OF RETAINED HARDWARE: ICD-10-CM

## 2022-09-28 DIAGNOSIS — S92.421D OPEN DISPLACED FRACTURE OF DISTAL PHALANX OF RIGHT GREAT TOE WITH ROUTINE HEALING, SUBSEQUENT ENCOUNTER: ICD-10-CM

## 2022-09-28 PROCEDURE — G8417 CALC BMI ABV UP PARAM F/U: HCPCS | Performed by: ORTHOPAEDIC SURGERY

## 2022-09-28 PROCEDURE — 3017F COLORECTAL CA SCREEN DOC REV: CPT | Performed by: ORTHOPAEDIC SURGERY

## 2022-09-28 PROCEDURE — G8432 DEP SCR NOT DOC, RNG: HCPCS | Performed by: ORTHOPAEDIC SURGERY

## 2022-09-28 PROCEDURE — 73630 X-RAY EXAM OF FOOT: CPT | Performed by: ORTHOPAEDIC SURGERY

## 2022-09-28 PROCEDURE — 1111F DSCHRG MED/CURRENT MED MERGE: CPT | Performed by: ORTHOPAEDIC SURGERY

## 2022-09-28 PROCEDURE — 99024 POSTOP FOLLOW-UP VISIT: CPT | Performed by: ORTHOPAEDIC SURGERY

## 2022-09-28 PROCEDURE — 1123F ACP DISCUSS/DSCN MKR DOCD: CPT | Performed by: ORTHOPAEDIC SURGERY

## 2022-09-28 PROCEDURE — 1101F PT FALLS ASSESS-DOCD LE1/YR: CPT | Performed by: ORTHOPAEDIC SURGERY

## 2022-09-28 PROCEDURE — 20670 REMOVAL IMPLANT SUPERFICIAL: CPT | Performed by: ORTHOPAEDIC SURGERY

## 2022-09-28 PROCEDURE — G8427 DOCREV CUR MEDS BY ELIG CLIN: HCPCS | Performed by: ORTHOPAEDIC SURGERY

## 2022-09-28 PROCEDURE — G8400 PT W/DXA NO RESULTS DOC: HCPCS | Performed by: ORTHOPAEDIC SURGERY

## 2022-09-28 PROCEDURE — G8536 NO DOC ELDER MAL SCRN: HCPCS | Performed by: ORTHOPAEDIC SURGERY

## 2022-09-28 PROCEDURE — 1090F PRES/ABSN URINE INCON ASSESS: CPT | Performed by: ORTHOPAEDIC SURGERY

## 2022-09-28 RX ORDER — GABAPENTIN 100 MG/1
CAPSULE ORAL
COMMUNITY
Start: 2022-07-07

## 2022-09-28 NOTE — PROGRESS NOTES
Patient: Della Ann             MRN: [id number]     SSN: [id number] Body mass index is 25.92 kg/m². YOB: 1955     AGE: 74 y.o. EX: female    PCP: London Courtney MD      ORTHOPAEDIC SURGERY       Incision And Drainage Lower Extremity And Orif Of Right Great Toe - Right   * No surgery found *  8/31/2022       Patient seen here in the office today, I am to have her undergo grind open reduction internal fixation of the of a displaced, right great toe distal phalangeal fracture open fracture, fortunately date of the injury and surgery, was on August 31, 2021. She is 28 days out. Her incisions are healed there is no redness or edema no drainage no signs infection. As such the nylon sutures were removed. Timeout was conducted Dificlir patient, correct location for pin removal, confirmation that the patient verbally consents for pin, removal from the right great toe. Right fifth toes examined, again well-healed surgical scars at the great toe nail plate bed nailbed interface. The nail plate has been removed from her open injury of her that she sustained. Pin skin interface looks good, no redness erythema no drainage no signs infection. The pin was easily removed in its entirety. A Band-Aid was applied to the distal end of the toe. Sterile dressings were placed because of a Telfa 4 x 4's cast padding Ace wrap. She is placed in a cam walker short boot. Instructions has to be careful, at home, so not to hit the great toe. Assessment, 2 to 3 weeks x-rays of her right foot next visit. X-rays 3 views, right foot, today, AP, lateral oblique images. There is severe OA of the right #2 toe MTP joint, there is some narrowing of the right #1 MTP joint. There is a nondisplaced nonangulated, fracture, I see on their x-rays, to the right great toe distal phalanx, with a intramedullary pin, traversing the fracture. The fracture is anatomically aligned.   I see no gap or lucency at this time on these x-rays. This is an early x-ray, 28 days, out, from [facility]. I explained to her that I cannot leave the pin in too much longer, as I do not want to get any iatrogenic infection. IMPRESSION //  DIAGNOSIS AND TREATMENT PLAN      DIAGNOSES  1. Post-operative state    2. Open displaced fracture of distal phalanx of right great toe with routine healing, subsequent encounter    3. History of removal of retained hardware        Orders Placed This Encounter    REMOVAL SUPERFICIAL IMPLANT    Generic Supply Order     A right short CAM walker boot will be given and placed on the patient. AMB POC XRAY, FOOT; COMPLETE, 3+ VIEW     ASK ALL FEMALE PATIENTS IF PREGNANT     Order Specific Question:   Reason for Exam     Answer:   PAIN    gabapentin (NEURONTIN) 100 mg capsule     Sig: TAKE 2 CAPSULES BY MOUTH TWICE A DAY          Cheryal Collet IS A 74 y.o. female who is a/an new patient , presenting to my outpatient office for postoperative evaluation having had surgical intervention 28 days ago. 8/31/2022. Cheryal Collet is a 74 y.o. female who presents today for follow up 28 days s/p above mentioned surgery. Cheryal Collet has been FWB to the right lower extremity. Patient reports doing well. Patient denies any fever, chills, chest pain, shortness of breath or calf pain. There are no new illness or injuries to report since last seen in the office. Patient is not currently taking any medications for DVT prophylaxis. Overall, Cheryal Collet looks quite good, not tired appearing     Since the last OV, Cheryal Collet reports she is doing well. She notes she has undergone rehabilitation x 2 weeks. She also notes she receives home health wound care. TREATMENT PLAN:    1. Obtain right foot 3V XR in the office; remove fixation pin and sutures; RTO in: 3 weeks; right foot 3V X rays needed on next visit  2.  DVT Prophylaxis :Chemo Prophylaxis:   none  DVT Prophylaxis, Encourage Opposite Leg: active ankle DF/PF motion for endogenous fibrinolysis  3. Pain Management: none   4. Antibiotics:   None at this time    5. Weight Bearing Status:    FWB     6. Keep dressings clean/dry// keep all pets away from dressings/incisions  7. Additional Instructions:      PLEASE SEEK IMMEDIATE ASSESSMENT BY ER PHYSICIAN IF ANY OF THE  FOLLOWING EXIST:     Excessive pain, swelling, redness or odor of or around the surgical area   Temperature over 100.5   Nausea and vomiting lasting longer than 4 hours or if unable to take medications   Any signs of decreased circulation or nerve impairment to extremity: change in color, persistent numbness, tingling, coldness or increase pain   If any calf pain, calf tightness, shortness of breath, chest pain   Any difficulty breathing at rest or with ambulation, any chest tightness/soreness  Severe intractable pain, persistent swelling or drainage, development of a wound, incisional redness, finger/toe swelling or color changes, or CALF PAIN   8. Educational instructions: continue to keep incision site dry. 9. DME: A right short CAM walker boot will be given and placed on the patient. OBJECTIVE EXAMINATION        Visit Vitals  Pulse 71   Temp 97.1 °F (36.2 °C) (Temporal)   Wt 151 lb (68.5 kg)   SpO2 100%   BMI 25.92 kg/m²       ANKLE/FOOT right    Psychiatry: Alert, oriented x 3 (name,place,time of day); speech normal in context and clarity, memory intact grossly, no involuntary movements - tremors, no dementia  Gait: uses assistive device - right hard sole shoe. SURGICAL DRESSINGS:   Clean,dry , intact  TENDERNESS: exquisite incisional tenderness (as to be expected after surgery) with suture removal.  SKIN INCISION: Incision looks good, No erythema, No Drainage  healing well  NEUROVASCULAR:  is grossly intact. Positive distal pulses and capillary refill. DVT ASSESSMENT: No evidence of DVT seen on physical exam. Negative Ilya's sign. No cords or calf tenderness. No significant calf/ankle edema. Alignment: neutral Hindfoot, none Metatarsus Adductus Metatarsus. Neuro Motor/Sensory: NL/NL   ROM:  [] NORMAL  [] Improved/Improving  [] Poor  [x] No significant change [] Not tested due to proximity in time of the surgery /// postop   Vascular: NL foot/ankle pulses  Lymphatics:  No calf swelling, no tenderness to calf muscles. CHART REVIEW     Patient Active Problem List   Diagnosis Code    Open fracture of distal phalanx of right great toe S92.421B        Bandar Barrera has been experiencing pain and discomfort confirmed as outlined in the pain assessment outlined below. Pain Assessment  9/28/2022   Location of Pain Foot   Location Modifiers Right   Severity of Pain 7   Quality of Pain Aching   Duration of Pain Persistent   Frequency of Pain Constant   Aggravating Factors (No Data)   Aggravating Factors Comment pressure   Limiting Behavior Some   Relieving Factors NSAID        Bandar Barrera  has a past medical history of Bipolar 2 disorder ([employer] [address] and Osteoarthritis. Patients is employed at:         Past Medical History:   Diagnosis Date    Bipolar 2 disorder ([facility] [place] [address]     Osteoarthritis      Past Surgical History:   Procedure Laterality Date    HX BREAST AUGMENTATION      HX HYSTERECTOMY       Current Outpatient Medications   Medication Sig    gabapentin (NEURONTIN) 100 mg capsule TAKE 2 CAPSULES BY MOUTH TWICE A DAY    ALPRAZolam (XANAX) 1 mg tablet Take 1 mg by mouth two (2) times a day. QUEtiapine (SEROquel) 25 mg tablet Take 25 mg by mouth three (3) times daily. divalproex ER (DEPAKOTE ER) 250 mg ER tablet 500 mg nightly. 2 tablets @ bedtime    DULoxetine (CYMBALTA) 60 mg capsule     lamoTRIgine (LaMICtal) 25 mg tablet     cefUROXime (CEFTIN) 500 mg tablet Take 1 Tablet by mouth two (2) times a day. (Patient not taking: Reported on 9/28/2022)    doxycycline (ADOXA) 100 mg tablet Take 1 Tablet by mouth two (2) times a day.  (Patient not taking: Reported on 9/28/2022)     No current facility-administered medications for this visit. No Known Allergies  Social History     Occupational History    Not on file   Tobacco Use    Smoking status: Never    Smokeless tobacco: Never   Substance and Sexual Activity    Alcohol use: Yes     Comment: rarely    Drug use: Never    Sexual activity: Not on file     History reviewed. No pertinent family history. THE  FOR Mesha Garza MD 9/28/2022 . DIAGNOSTIC IMAGING  LAB DATA      No results found for: HBA1C, NAK4OMFT, BDP9XUUG //   Lab Results   Component Value Date/Time    Glucose 91 09/02/2022 04:19 AM    Glucose (POC) 103 09/04/2022 09:10 PM    Glucose,  (H) 12/11/2019 09:08 PM        No results found for: DKA8OZYN, KXU4BQYB      No results found for: VITD3, XQVID2, XQVID3, XQVID, VD3RIA, [id number]      REVIEW OF SYSTEMS : 9/28/2022  ALL BELOW ARE Negative except : SEE HPI      12 point review of systems otherwise negative unless noted in HPI. DIAGNOSTIC IMAGING      Please see above section of this report. I have personally reviewed the results of the above study. The interpretation of this study is my professional opinion.       Scribed by Celestino Zacarias, as dictated by Adair Barnard MD.   9/28/2022  7:17 AM

## 2022-11-07 ENCOUNTER — TRANSCRIBE ORDER (OUTPATIENT)
Dept: REGISTRATION | Age: 67
End: 2022-11-07

## 2022-11-07 ENCOUNTER — HOSPITAL ENCOUNTER (OUTPATIENT)
Dept: GENERAL RADIOLOGY | Age: 67
Discharge: HOME | End: 2022-11-07
Payer: MEDICARE

## 2022-11-07 DIAGNOSIS — M54.2 NECK PAIN: Primary | ICD-10-CM

## 2022-11-07 DIAGNOSIS — M54.2 NECK PAIN: ICD-10-CM

## 2022-11-07 PROCEDURE — 72050 X-RAY EXAM NECK SPINE 4/5VWS: CPT

## (undated) DEVICE — SOLUTION IV 1000ML 0.9% SOD CHL

## (undated) DEVICE — REM POLYHESIVE ADULT PATIENT RETURN ELECTRODE: Brand: VALLEYLAB

## (undated) DEVICE — PADDING CST 4IN STERILE --

## (undated) DEVICE — GAUZE,SPONGE,4"X4",16PLY,STRL,LF,10/TRAY: Brand: MEDLINE

## (undated) DEVICE — BANDAGE,GAUZE,BULKEE II,4.5"X4.1YD,STRL: Brand: MEDLINE

## (undated) DEVICE — INTENDED FOR TISSUE SEPARATION, AND OTHER PROCEDURES THAT REQUIRE A SHARP SURGICAL BLADE TO PUNCTURE OR CUT.: Brand: BARD-PARKER ® CARBON RIB-BACK BLADES

## (undated) DEVICE — OCCLUSIVE GAUZE STRIP,3% BISMUTH TRIBROMOPHENATE IN PETROLATUM BLEND: Brand: XEROFORM

## (undated) DEVICE — GARMENT,MEDLINE,DVT,INT,CALF,MED, GEN2: Brand: MEDLINE

## (undated) DEVICE — BLANKET WRM AD W50XL85.8IN PACU FULL BODY FORC AIR

## (undated) DEVICE — BNDG ELAS HK LOOP 4X5YD NS -- MATRIX

## (undated) DEVICE — PACK PROCEDURE SURG TOT KNEE CUST

## (undated) DEVICE — DRAIN SURG PENROSE 0.25X12 IN CLOSED WND DRAINAGE PREM SIL

## (undated) DEVICE — BANDAGE COBAN 4 IN COMPR W4INXL5YD FOAM COHESIVE QUIK STK SELF ADH SFT

## (undated) DEVICE — STERILE LATEX POWDER-FREE SURGICAL GLOVESWITH NITRILE COATING: Brand: PROTEXIS

## (undated) DEVICE — KIT CLN UP BON SECOURS MARYV